# Patient Record
Sex: MALE | Race: BLACK OR AFRICAN AMERICAN | Employment: FULL TIME | URBAN - METROPOLITAN AREA
[De-identification: names, ages, dates, MRNs, and addresses within clinical notes are randomized per-mention and may not be internally consistent; named-entity substitution may affect disease eponyms.]

---

## 2017-08-10 ENCOUNTER — HOSPITAL ENCOUNTER (EMERGENCY)
Age: 72
Discharge: HOME | End: 2017-08-10
Attending: EMERGENCY MEDICINE | Admitting: EMERGENCY MEDICINE
Payer: MEDICARE

## 2017-08-10 ENCOUNTER — APPOINTMENT (OUTPATIENT)
Dept: CT IMAGING | Age: 72
End: 2017-08-10
Attending: EMERGENCY MEDICINE
Payer: MEDICARE

## 2017-08-10 VITALS
HEART RATE: 64 BPM | HEIGHT: 71 IN | BODY MASS INDEX: 25.2 KG/M2 | TEMPERATURE: 98.2 F | SYSTOLIC BLOOD PRESSURE: 169 MMHG | OXYGEN SATURATION: 100 % | RESPIRATION RATE: 19 BRPM | WEIGHT: 180 LBS | DIASTOLIC BLOOD PRESSURE: 74 MMHG

## 2017-08-10 DIAGNOSIS — D64.9 CHRONIC ANEMIA: ICD-10-CM

## 2017-08-10 DIAGNOSIS — R25.3 TWITCHING: ICD-10-CM

## 2017-08-10 DIAGNOSIS — R25.1 EPISODE OF SHAKING: Primary | ICD-10-CM

## 2017-08-10 LAB
ALBUMIN SERPL BCP-MCNC: 3.5 G/DL (ref 3.4–5)
ALBUMIN/GLOB SERPL: 1 {RATIO} (ref 0.8–1.7)
ALP SERPL-CCNC: 97 U/L (ref 45–117)
ALT SERPL-CCNC: 19 U/L (ref 16–61)
ANION GAP BLD CALC-SCNC: 9 MMOL/L (ref 3–18)
AST SERPL W P-5'-P-CCNC: 13 U/L (ref 15–37)
BASOPHILS # BLD AUTO: 0 K/UL (ref 0–0.06)
BASOPHILS # BLD: 0 % (ref 0–2)
BILIRUB SERPL-MCNC: 0.2 MG/DL (ref 0.2–1)
BUN SERPL-MCNC: 22 MG/DL (ref 7–18)
BUN/CREAT SERPL: 12 (ref 12–20)
CALCIUM SERPL-MCNC: 8.5 MG/DL (ref 8.5–10.1)
CHLORIDE SERPL-SCNC: 109 MMOL/L (ref 100–108)
CK MB CFR SERPL CALC: 1.3 % (ref 0–4)
CK MB SERPL-MCNC: 2.7 NG/ML (ref 5–25)
CK SERPL-CCNC: 216 U/L (ref 39–308)
CO2 SERPL-SCNC: 23 MMOL/L (ref 21–32)
CREAT SERPL-MCNC: 1.78 MG/DL (ref 0.6–1.3)
DIFFERENTIAL METHOD BLD: ABNORMAL
EOSINOPHIL # BLD: 0.5 K/UL (ref 0–0.4)
EOSINOPHIL NFR BLD: 9 % (ref 0–5)
ERYTHROCYTE [DISTWIDTH] IN BLOOD BY AUTOMATED COUNT: 13.4 % (ref 11.6–14.5)
GLOBULIN SER CALC-MCNC: 3.4 G/DL (ref 2–4)
GLUCOSE SERPL-MCNC: 184 MG/DL (ref 74–99)
HCT VFR BLD AUTO: 33.8 % (ref 36–48)
HGB BLD-MCNC: 10.9 G/DL (ref 13–16)
LYMPHOCYTES # BLD AUTO: 45 % (ref 21–52)
LYMPHOCYTES # BLD: 2.7 K/UL (ref 0.9–3.6)
MCH RBC QN AUTO: 29.5 PG (ref 24–34)
MCHC RBC AUTO-ENTMCNC: 32.2 G/DL (ref 31–37)
MCV RBC AUTO: 91.6 FL (ref 74–97)
MONOCYTES # BLD: 0.4 K/UL (ref 0.05–1.2)
MONOCYTES NFR BLD AUTO: 7 % (ref 3–10)
NEUTS SEG # BLD: 2.4 K/UL (ref 1.8–8)
NEUTS SEG NFR BLD AUTO: 39 % (ref 40–73)
PLATELET # BLD AUTO: 217 K/UL (ref 135–420)
PMV BLD AUTO: 11.1 FL (ref 9.2–11.8)
POTASSIUM SERPL-SCNC: 4 MMOL/L (ref 3.5–5.5)
PROT SERPL-MCNC: 6.9 G/DL (ref 6.4–8.2)
RBC # BLD AUTO: 3.69 M/UL (ref 4.7–5.5)
SODIUM SERPL-SCNC: 141 MMOL/L (ref 136–145)
TROPONIN I SERPL-MCNC: <0.02 NG/ML (ref 0–0.04)
WBC # BLD AUTO: 6 K/UL (ref 4.6–13.2)

## 2017-08-10 PROCEDURE — 99284 EMERGENCY DEPT VISIT MOD MDM: CPT

## 2017-08-10 PROCEDURE — 85025 COMPLETE CBC W/AUTO DIFF WBC: CPT | Performed by: EMERGENCY MEDICINE

## 2017-08-10 PROCEDURE — 74011250636 HC RX REV CODE- 250/636: Performed by: EMERGENCY MEDICINE

## 2017-08-10 PROCEDURE — 82550 ASSAY OF CK (CPK): CPT | Performed by: EMERGENCY MEDICINE

## 2017-08-10 PROCEDURE — 80053 COMPREHEN METABOLIC PANEL: CPT | Performed by: EMERGENCY MEDICINE

## 2017-08-10 PROCEDURE — 70450 CT HEAD/BRAIN W/O DYE: CPT

## 2017-08-10 PROCEDURE — 74011250637 HC RX REV CODE- 250/637: Performed by: EMERGENCY MEDICINE

## 2017-08-10 PROCEDURE — 96360 HYDRATION IV INFUSION INIT: CPT

## 2017-08-10 RX ORDER — LORAZEPAM 0.5 MG/1
0.5 TABLET ORAL
Status: COMPLETED | OUTPATIENT
Start: 2017-08-10 | End: 2017-08-10

## 2017-08-10 RX ORDER — DIAZEPAM 2 MG/1
2 TABLET ORAL
Qty: 6 TAB | Refills: 0 | Status: SHIPPED | OUTPATIENT
Start: 2017-08-10 | End: 2017-11-15

## 2017-08-10 RX ORDER — FLUTICASONE PROPIONATE AND SALMETEROL 250; 50 UG/1; UG/1
1 POWDER RESPIRATORY (INHALATION) EVERY 12 HOURS
COMMUNITY
End: 2018-06-15

## 2017-08-10 RX ADMIN — LORAZEPAM 0.5 MG: 0.5 TABLET ORAL at 03:16

## 2017-08-10 RX ADMIN — SODIUM CHLORIDE 1000 ML: 900 INJECTION, SOLUTION INTRAVENOUS at 02:03

## 2017-08-10 NOTE — DISCHARGE INSTRUCTIONS
The reasoning behind your twitching/jerking episodes is unclear at this time. You will need to follow up with your regular doctor for further evaluation, possible neurology referral    Avoid excessive caffeine use or other stimulants.    Get plenty of sleep  Avoid alcohol, smoking, and illegal drugs

## 2017-08-10 NOTE — ED PROVIDER NOTES
HPI Comments: Cristy Bethea is a 71 y.o. Male with h/o niddm with c/o intermittent, freq twitching all over that started earlier yesterday. Waking him up tonight. Had headache earlier that resolved. No numbness, tingling, weakness, gait diff, or diff driving here. No visual dist, swallowing diff, cp, sob, abd pain, nvd, fcs, urinary sx. No new meds. Smokes occ. No sig alcohol or drug use    The history is provided by the patient. Past Medical History:   Diagnosis Date    Diabetes mellitus ([address]     Hypercholesteremia     Hypertension        History reviewed. No pertinent surgical history. History reviewed. No pertinent family history. Social History     Social History    Marital status: SINGLE     Spouse name: N/A    Number of children: N/A    Years of education: N/A     Occupational History    Not on file. Social History Main Topics    Smoking status: Current Every Day Smoker     Packs/day: 0.50     Years: 40.00     Types: Cigarettes    Smokeless tobacco: Former User     Quit date: 7/7/2009      Comment: Prior to quiting 1 pack per day    Alcohol use Yes      Comment: occasionally    Drug use: No    Sexual activity: Not on file     Other Topics Concern    Not on file     Social History Narrative         ALLERGIES: Review of patient's allergies indicates no known allergies. Review of Systems   Constitutional: Negative for fever. HENT: Negative for sore throat, tinnitus and trouble swallowing. Eyes: Negative for visual disturbance. Respiratory: Negative for cough and shortness of breath. Cardiovascular: Negative for chest pain. Gastrointestinal: Negative for abdominal pain. Genitourinary: Negative for difficulty urinating. Musculoskeletal: Negative for gait problem. Skin: Negative for rash. Allergic/Immunologic: Negative for immunocompromised state. Neurological: Negative for dizziness, facial asymmetry and light-headedness.    Psychiatric/Behavioral: Positive for sleep disturbance. Vitals:    08/10/17 0147   BP: 198/85   Pulse: 69   Resp: 14   Temp: 98.2 °F (36.8 °C)   SpO2: 100%   Weight: 81.6 kg (180 lb)   Height: 5' 11\" (1.803 m)            Physical Exam   Constitutional: He is oriented to person, place, and time. Non-toxic appearance. He does not appear ill. No distress. HENT:   Head: Normocephalic and atraumatic. Right Ear: External ear normal.   Left Ear: External ear normal.   Nose: Nose normal.   Mouth/Throat: Oropharynx is clear and moist. No oropharyngeal exudate. Eyes: Conjunctivae and EOM are normal. Pupils are equal, round, and reactive to light. Neck: Normal range of motion. Neck supple. Cardiovascular: Normal rate, regular rhythm, normal heart sounds and intact distal pulses. Pulmonary/Chest: Effort normal and breath sounds normal. No respiratory distress. Abdominal: Soft. There is no tenderness. Musculoskeletal: Normal range of motion. He exhibits no edema. Neurological: He is alert and oriented to person, place, and time. He displays no tremor and normal reflexes. No cranial nerve deficit (3-12) or sensory deficit (gross touch intact all ext). He exhibits normal muscle tone. He displays no seizure activity. Coordination (nl FN) and gait normal. GCS eye subscore is 4. GCS verbal subscore is 5. GCS motor subscore is 6. No drift   Skin: Skin is warm and dry. He is not diaphoretic. Psychiatric: His behavior is normal.   Nursing note and vitals reviewed.        [facility]  ED Course       Procedures    Vitals:  Patient Vitals for the past 12 hrs:   Temp Pulse Resp BP SpO2   08/10/17 0147 98.2 °F (36.8 °C) 69 14 198/85 100 %         Medications ordered:   Medications   sodium chloride 0.9 % bolus infusion 1,000 mL (0 mL IntraVENous IV Completed 8/10/17 0240)         Lab findings:  Recent Results (from the past 12 hour(s))   CBC WITH AUTOMATED DIFF    Collection Time: 08/10/17  2:00 AM   Result Value Ref Range    WBC 6.0 4.6 - 13.2 K/uL    RBC 3.69 (L) 4.70 - 5.50 M/uL    HGB 10.9 (L) 13.0 - 16.0 g/dL    HCT 33.8 (L) 36.0 - 48.0 %    MCV 91.6 74.0 - 97.0 FL    MCH 29.5 24.0 - 34.0 PG    MCHC 32.2 31.0 - 37.0 g/dL    RDW 13.4 11.6 - 14.5 %    PLATELET 074 513 - 163 K/uL    MPV 11.1 9.2 - 11.8 FL    NEUTROPHILS 39 (L) 40 - 73 %    LYMPHOCYTES 45 21 - 52 %    MONOCYTES 7 3 - 10 %    EOSINOPHILS 9 (H) 0 - 5 %    BASOPHILS 0 0 - 2 %    ABS. NEUTROPHILS 2.4 1.8 - 8.0 K/UL    ABS. LYMPHOCYTES 2.7 0.9 - 3.6 K/UL    ABS. MONOCYTES 0.4 0.05 - 1.2 K/UL    ABS. EOSINOPHILS 0.5 (H) 0.0 - 0.4 K/UL    ABS. BASOPHILS 0.0 0.0 - 0.06 K/UL    DF AUTOMATED     METABOLIC PANEL, COMPREHENSIVE    Collection Time: 08/10/17  2:00 AM   Result Value Ref Range    Sodium 141 136 - 145 mmol/L    Potassium 4.0 3.5 - 5.5 mmol/L    Chloride 109 (H) 100 - 108 mmol/L    CO2 23 21 - 32 mmol/L    Anion gap 9 3.0 - 18 mmol/L    Glucose 184 (H) 74 - 99 mg/dL    BUN 22 (H) 7.0 - 18 MG/DL    Creatinine 1.78 (H) 0.6 - 1.3 MG/DL    BUN/Creatinine ratio 12 12 - 20      GFR est AA 46 (L) >60 ml/min/1.73m2    GFR est non-AA 38 (L) >60 ml/min/1.73m2    Calcium 8.5 8.5 - 10.1 MG/DL    Bilirubin, total 0.2 0.2 - 1.0 MG/DL    ALT (SGPT) 19 16 - 61 U/L    AST (SGOT) 13 (L) 15 - 37 U/L    Alk. phosphatase 97 45 - 117 U/L    Protein, total 6.9 6.4 - 8.2 g/dL    Albumin 3.5 3.4 - 5.0 g/dL    Globulin 3.4 2.0 - 4.0 g/dL    A-G Ratio 1.0 0.8 - 1.7     CARDIAC PANEL,(CK, CKMB & TROPONIN)    Collection Time: 08/10/17  2:00 AM   Result Value Ref Range     39 - 308 U/L    CK - MB 2.7 <3.6 ng/ml    CK-MB Index 1.3 0.0 - 4.0 %    Troponin-I, Qt. <0.02 0.0 - 0.045 NG/ML       EKG interpretation by ED Physician:      X-Ray, CT or other radiology findings or impressions:  CT HEAD WO CONT    (Results Pending)     -limited by motion, but nothing acute-mild chronic microvascular dx  Progress notes, Consult notes or additional Procedure notes:   No sig episodes. Not c/w seizures.  Doubt infection need for other work up here  I have discussed with patient and/or family/sig other the results, interpretation of any imaging if performed, suspected diagnosis and treatment plan to include instructions regarding the diagnoses listed to which understanding was expressed with all questions answered      Reevaluation of patient:   Stable for dc    Disposition:  Diagnosis:   1. Episode of shaking    2. Twitching    3. Chronic anemia        Disposition: home      Follow-up Information     Follow up With Details Comments Contact Info    Divya Duarte MD Schedule an appointment as soon as possible for a visit  [address] [facility] EMERGENCY DEPT  If symptoms worsen [address]  [phone]            Patient's Medications   Start Taking    DIAZEPAM (VALIUM) 2 MG TABLET    Take 1 Tab by mouth every twelve (12) hours as needed (shaking). Max Daily Amount: 4 mg. Continue Taking    ALBUTEROL (PROVENTIL HFA) 90 MCG/ACTUATION INHALER    Take 1 Puff by inhalation every four (4) hours as needed for Wheezing. ASPIRIN DELAYED-RELEASE 81 MG TABLET    Take  by mouth daily. DILTIAZEM XR (DILACOR XR) 240 MG XR CAPSULE    Take  by mouth daily. FLUTICASONE-SALMETEROL (ADVAIR DISKUS) 250-50 MCG/DOSE DISKUS INHALER    Take 1 Puff by inhalation every twelve (12) hours. GLIPIZIDE (GLUCOTROL) 10 MG TABLET    Take 10 mg by mouth two (2) times a day. LISINOPRIL (PRINIVIL) 20 MG TABLET    Take  by mouth daily. METFORMIN (GLUCOPHAGE) 850 MG TABLET    Take  by mouth two (2) times daily (with meals). These Medications have changed    No medications on file   Stop Taking    ACETAMINOPHEN-CODEINE (TYLENOL-CODEINE #3) 300-30 MG PER TABLET    Take 1 Tab by mouth every four (4) hours as needed for Pain. Max Daily Amount: 6 Tabs.     AZITHROMYCIN (ZITHROMAX Z-JEAN) 250 MG TABLET    Take two tablets today then one tablet daily    OTHER    Has an inhaler--unsure of the name

## 2017-08-10 NOTE — ED NOTES
,I have reviewed discharge instructions with the patient. The patient verbalized understanding.     Patient armband removed and shredded

## 2017-08-10 NOTE — LETTER
NOTIFICATION RETURN TO WORK / SCHOOL 
 
8/10/2017 3:02 AM 
 
Mr. Floridalma Alegria [phone] To Whom It May Concern: 
 
Floridalma Alegria is currently under the care of [address] [place] [address] EMERGENCY DEPT. He will return to work/school on: 8/11/17 If there are questions or concerns please have the patient contact our office. Sincerely, aMrifer Aguayo MD

## 2017-08-10 NOTE — ED NOTES
Observed that patient is having what he calls \"shakes\"- intermittent muscle spasms in patient's upper body/torso/arms/hands that conclude with the patient to briefly pausing in place with wide open eyes in a blank stare.

## 2017-11-15 ENCOUNTER — HOSPITAL ENCOUNTER (EMERGENCY)
Age: 72
Discharge: HOME | End: 2017-11-16
Attending: EMERGENCY MEDICINE
Payer: MEDICARE

## 2017-11-15 DIAGNOSIS — R06.2 WHEEZING: ICD-10-CM

## 2017-11-15 DIAGNOSIS — R25.1 EPISODE OF SHAKING: Primary | ICD-10-CM

## 2017-11-15 PROCEDURE — 99284 EMERGENCY DEPT VISIT MOD MDM: CPT

## 2017-11-16 VITALS
HEIGHT: 71 IN | OXYGEN SATURATION: 96 % | WEIGHT: 181 LBS | HEART RATE: 73 BPM | RESPIRATION RATE: 16 BRPM | DIASTOLIC BLOOD PRESSURE: 84 MMHG | SYSTOLIC BLOOD PRESSURE: 180 MMHG | TEMPERATURE: 97.6 F | BODY MASS INDEX: 25.34 KG/M2

## 2017-11-16 LAB — GLUCOSE BLD STRIP.AUTO-MCNC: 82 MG/DL (ref 70–110)

## 2017-11-16 PROCEDURE — 77030029684 HC NEB SM VOL KT MONA -A

## 2017-11-16 PROCEDURE — 74011000250 HC RX REV CODE- 250: Performed by: PHYSICIAN ASSISTANT

## 2017-11-16 PROCEDURE — 82962 GLUCOSE BLOOD TEST: CPT

## 2017-11-16 PROCEDURE — 94640 AIRWAY INHALATION TREATMENT: CPT

## 2017-11-16 RX ORDER — GABAPENTIN 100 MG/1
100 CAPSULE ORAL 3 TIMES DAILY
Qty: 30 CAP | Refills: 0 | Status: SHIPPED | OUTPATIENT
Start: 2017-11-16 | End: 2018-05-09

## 2017-11-16 RX ORDER — IPRATROPIUM BROMIDE AND ALBUTEROL SULFATE 2.5; .5 MG/3ML; MG/3ML
3 SOLUTION RESPIRATORY (INHALATION)
Status: COMPLETED | OUTPATIENT
Start: 2017-11-16 | End: 2017-11-16

## 2017-11-16 RX ADMIN — IPRATROPIUM BROMIDE AND ALBUTEROL SULFATE 3 ML: .5; 3 SOLUTION RESPIRATORY (INHALATION) at 00:21

## 2017-11-16 NOTE — ED PROVIDER NOTES
HPI Comments: Paula Lugo is a 71 y.o. male with a pertinent history of DM, HTN, HLD who presents to the emergency department for evaluation of intermittent body twitching x 1 day. States this happened to him in August and it was exactly the same. No recent changes in medications. No drug or ETOH abuse. Denies caffeine. States twitching is \"all over. \"  No generalized or focal numbness/weakness/tingling. States he followed up with PCP after last visit, but was never referred to neurology. Pt denies any fevers or chills, headache, head injury, visual changes, dizziness or light headedness, ENT issues, CP or discomfort, SOB, cough, n/v/d/c, abd pain, back pain, diaphoresis, dysuria, hematuria, frequency. Patient has no other complaints at this time. PCP:  Dontae Brown MD        Patient is a 71 y.o. male presenting with other event. Other   Pertinent negatives include no chest pain, no abdominal pain, no headaches and no shortness of breath. Past Medical History:   Diagnosis Date    Diabetes mellitus ([facility] [address]     Hypercholesteremia     Hypertension        No past surgical history on file. No family history on file. Social History     Social History    Marital status: SINGLE     Spouse name: N/A    Number of children: N/A    Years of education: N/A     Occupational History    Not on file. Social History Main Topics    Smoking status: Current Every Day Smoker     Packs/day: 0.50     Years: 40.00     Types: Cigarettes    Smokeless tobacco: Former User     Quit date: 7/7/2009      Comment: Prior to quiting 1 pack per day    Alcohol use Yes      Comment: occasionally    Drug use: No    Sexual activity: Not on file     Other Topics Concern    Not on file     Social History Narrative         ALLERGIES: Review of patient's allergies indicates no known allergies. Review of Systems   Constitutional: Negative for chills and fever.    HENT: Negative for congestion, rhinorrhea and sore throat. Respiratory: Negative for cough and shortness of breath. Cardiovascular: Negative for chest pain. Gastrointestinal: Negative for abdominal pain, constipation, diarrhea, nausea and vomiting. Musculoskeletal: Negative for back pain and myalgias. Skin: Negative for wound. Neurological: Positive for tremors. Negative for dizziness, syncope, facial asymmetry, weakness, numbness and headaches. Vitals:    11/15/17 2252   BP: 183/86   Pulse: 70   Resp: 16   Temp: 97.6 °F (36.4 °C)   SpO2: 95%   Weight: 82.1 kg (181 lb)   Height: 5' 11\" (1.803 m)            Physical Exam   Constitutional: He is oriented to person, place, and time. He appears well-developed and well-nourished. No distress. HENT:   Head: Normocephalic and atraumatic. Mouth/Throat: Oropharynx is clear and moist.   Eyes: Conjunctivae and EOM are normal. Right eye exhibits no discharge. Left eye exhibits no discharge. Neck: Normal range of motion. Neck supple. Cardiovascular: Normal rate, regular rhythm and normal heart sounds. Exam reveals no gallop and no friction rub. No murmur heard. Pulmonary/Chest: Effort normal. No respiratory distress. He has wheezes. He exhibits no tenderness. Bilateral inspiratory and expiratory wheezing   Musculoskeletal: Normal range of motion. He exhibits no edema or deformity. Neurological: He is alert and oriented to person, place, and time. No tremor/shaking/convusions noted   Skin: Skin is warm and dry. He is not diaphoretic. Psychiatric: He has a normal mood and affect. Nursing note and vitals reviewed. MDM  Number of Diagnoses or Management Options  Episode of shaking: minor  Wheezing: new and does not require workup  Diagnosis management comments: Differential Diagnosis: BET, parkinson's, electrolyte abnormality, medication effect, anxiety, substance abuse    Plan:  Pt presents in NAD, vitals wnl. Wheezing on exam.  Otherwise unremarkable.   No tremors noted here.  Pt denies them at this time. POC glucose wnl. Will DC home with outpatient neuro follow-up. Will DC home with 100mg neurontin TID per Dr. Airam Jackson. At this time, patient is stable and appropriate for discharge home. Patient demonstrates understanding of current diagnoses and is in agreement with the treatment plan. They are advised that while the likelihood of serious underlying condition is low at this point given the evaluation performed today, we cannot fully rule it out. They are advised to immediately return with any new symptoms or worsening of current condition. All questions have been answered. Patient is given educational material regarding their diagnoses, including danger symptoms and when to return to the ED. Amount and/or Complexity of Data Reviewed  Clinical lab tests: ordered and reviewed  Review and summarize past medical records: yes  Discuss the patient with other providers: yes (Dr. Airam Jackson  )    Risk of Complications, Morbidity, and/or Mortality  Presenting problems: low  Diagnostic procedures: minimal  Management options: low    Patient Progress  Patient progress: stable    ED Course       Procedures    -------------------------------------------------------------------------------------------------------------------  Orders:  Orders Placed This Encounter    POC GLUCOSE     Standing Status:   Standing     Number of Occurrences:   1    GLUCOSE, POC     Standing Status:   Standing     Number of Occurrences:   1    SALINE LOCK IV ONE TIME STAT     Standing Status:   Standing     Number of Occurrences:   1    albuterol-ipratropium (DUO-NEB) 2.5 MG-0.5 MG/3 ML     Order Specific Question:   MODE OF DELIVERY     Answer:   Nebulizer    gabapentin (NEURONTIN) 100 mg capsule     Sig: Take 1 Cap by mouth three (3) times daily.      Dispense:  30 Cap     Refill:  0        Lab Results:   Recent Results (from the past 12 hour(s))   GLUCOSE, POC    Collection Time: 11/16/17 12:20 AM Result Value Ref Range    Glucose (POC) 82 70 - 110 mg/dL     Progress Notes:  12:05 AM:  Taqueria Linares PA-C was at the pt's bedside, assessed the pt and answered the pt's questions regarding treatment. 12:08 AM:  Discussed with Dr. Tila Brock. Check POC BG. No indication for repeat labs or imaging. Taqueria Linares PA-C    -------------------------------------------------------------------------------------------------------------------    Disposition:  Diagnosis:   1. Episode of shaking    2. Wheezing        [facility]    Follow-up Information     Follow up With Details Comments Contact Info    Javier Wick MD Call in 1 day For follow-up regarding abnormal shaking  [address]  [facility]  [phone]      [facility] EMERGENCY DEPT Go to As needed, If symptoms worsen [address] [facility] [address]. [phone]          Patient's Medications   Start Taking    GABAPENTIN (NEURONTIN) 100 MG CAPSULE    Take 1 Cap by mouth three (3) times daily. Continue Taking    ALBUTEROL (PROVENTIL HFA) 90 MCG/ACTUATION INHALER    Take 1 Puff by inhalation every four (4) hours as needed for Wheezing. ASPIRIN DELAYED-RELEASE 81 MG TABLET    Take  by mouth daily. DILTIAZEM XR (DILACOR XR) 240 MG XR CAPSULE    Take  by mouth daily. FLUTICASONE-SALMETEROL (ADVAIR DISKUS) 250-50 MCG/DOSE DISKUS INHALER    Take 1 Puff by inhalation every twelve (12) hours. GLIPIZIDE (GLUCOTROL) 10 MG TABLET    Take 10 mg by mouth two (2) times a day. LISINOPRIL (PRINIVIL) 20 MG TABLET    Take  by mouth daily. METFORMIN (GLUCOPHAGE) 850 MG TABLET    Take  by mouth two (2) times daily (with meals). These Medications have changed    No medications on file   Stop Taking    DIAZEPAM (VALIUM) 2 MG TABLET    Take 1 Tab by mouth every twelve (12) hours as needed (shaking). Max Daily Amount: 4 mg.

## 2017-11-16 NOTE — ED NOTES
Patient with c/o \"shaking\" x 2 days. States he has tremors in his arms that he can not stop. Reports productive cough producing yellow mucous. Denies chest pain, denies SOB.

## 2017-11-16 NOTE — ED TRIAGE NOTES
Patient states \"shaking\" off and on since yesterday. States history of this, was seen here and told it was \"caffeine and stress\". Patient denies any caffeine. Denies any weakness or pain. No LOC or recent fall.

## 2017-11-16 NOTE — DISCHARGE INSTRUCTIONS
Wheezing or Bronchoconstriction: Care Instructions  Your Care Instructions  Wheezing is a whistling noise made during breathing. It occurs when the small airways, or bronchial tubes, that lead to your lungs swell or contract (spasm) and become narrow. This narrowing is called bronchoconstriction. When your airways constrict, it is hard for air to pass through and this makes it hard for you to breathe. Wheezing and bronchoconstriction can be caused by many problems, including:  · An infection such as the flu or a cold. · Allergies such as hay fever. · Diseases such as asthma or chronic obstructive pulmonary disease. · Smoking. Treatment for your wheezing depends on what is causing the problem. Your wheezing may get better without treatment. But you may need to pay attention to things that cause your wheezing and avoid them. Or you may need medicine to help treat the wheezing and to reduce the swelling or to relieve spasms in your lungs. Follow-up care is a key part of your treatment and safety. Be sure to make and go to all appointments, and call your doctor if you are having problems. It is also a good idea to know your test results and keep a list of the medicines you take. How can you care for yourself at home? · Take your medicine exactly as prescribed. Call your doctor if you think you are having a problem with your medicine. You will get more details on the specific medicine your doctor prescribes. · If your doctor prescribed antibiotics, take them as directed. Do not stop taking them just because you feel better. You need to take the full course of antibiotics. · Breathe moist air from a humidifier, hot shower, or sink filled with hot water. This may help ease your symptoms and make it easier for you to breathe. · If you have congestion in your nose and throat, drinking plenty of fluids, especially hot fluids, may help relieve your symptoms.  If you have kidney, heart, or liver disease and have to limit fluids, talk with your doctor before you increase the amount of fluids you drink. · If you have mucus in your airways, it may help to breathe deeply and cough. · Do not smoke or allow others to smoke around you. Smoking can make your wheezing worse. If you need help quitting, talk to your doctor about stop-smoking programs and medicines. These can increase your chances of quitting for good. · Avoid things that may cause your wheezing. These may include colds, smoke, air pollution, dust, pollen, pets, cockroaches, stress, and cold air. When should you call for help? Call 911 anytime you think you may need emergency care. For example, call if:  ? · You have severe trouble breathing. ? · You passed out (lost consciousness). ?Call your doctor now or seek immediate medical care if:  ? · You cough up yellow, dark brown, or bloody mucus (sputum). ? · You have new or worse shortness of breath. ? · Your wheezing is not getting better or it gets worse after you start taking your medicine. ? Watch closely for changes in your health, and be sure to contact your doctor if:  ? · You do not get better as expected. Where can you learn more? Go to http://lula-antonia.info/. Enter 454 [id number] in the search box to learn more about \"Wheezing or Bronchoconstriction: Care Instructions. \"  Current as of: May 12, 2017  Content Version: 11.4  © 6109-7311 [employer]. Care instructions adapted under license by [employer] (which disclaims liability or warranty for this information). If you have questions about a medical condition or this instruction, always ask your healthcare professional. [address] any warranty or liability for your use of this information.

## 2018-07-19 ENCOUNTER — HOSPITAL ENCOUNTER (OUTPATIENT)
Dept: CT IMAGING | Age: 73
Discharge: HOME | End: 2018-07-19
Attending: PHYSICIAN ASSISTANT
Payer: MEDICARE

## 2018-07-19 ENCOUNTER — HOSPITAL ENCOUNTER (OUTPATIENT)
Dept: NUCLEAR MEDICINE | Age: 73
Discharge: HOME | End: 2018-07-19
Attending: PHYSICIAN ASSISTANT
Payer: MEDICARE

## 2018-07-19 DIAGNOSIS — C61 PROSTATE CANCER (HCC): ICD-10-CM

## 2018-07-19 PROCEDURE — 78306 BONE IMAGING WHOLE BODY: CPT

## 2018-07-19 PROCEDURE — 74176 CT ABD & PELVIS W/O CONTRAST: CPT

## 2018-07-19 NOTE — PROGRESS NOTES
Patient was scheduled for nuclear medicine bone scan and CT scan today 7/19/18. Patient completed CT scan and was then sent home by CT department. Contacted patient via mobile phone and asked if he was able to come back today to complete bone scan. Patient requested to reschedule bone scan for Monday 7/23/18 at 915. Radiology  received call from patient's daughter @ [phone]. Patient will be returning today to complete bone scan.

## 2018-07-25 PROBLEM — C61 MALIGNANT NEOPLASM OF PROSTATE (HCC): Status: ACTIVE | Noted: 2018-07-25

## 2018-08-29 ENCOUNTER — HOSPITAL ENCOUNTER (OUTPATIENT)
Dept: PREADMISSION TESTING | Age: 73
Discharge: HOME | End: 2018-08-29
Payer: MEDICARE

## 2018-08-29 ENCOUNTER — HOSPITAL ENCOUNTER (OUTPATIENT)
Dept: LAB | Age: 73
Discharge: HOME | End: 2018-08-29
Payer: MEDICARE

## 2018-08-29 DIAGNOSIS — Z01.818 PRE-OP EVALUATION: ICD-10-CM

## 2018-08-29 DIAGNOSIS — C61 MALIGNANT NEOPLASM OF PROSTATE (HCC): ICD-10-CM

## 2018-08-29 LAB
ABO + RH BLD: NORMAL
ANION GAP SERPL CALC-SCNC: 5 MMOL/L (ref 3–18)
BASOPHILS # BLD: 0 K/UL (ref 0–0.1)
BASOPHILS NFR BLD: 0 % (ref 0–2)
BLOOD GROUP ANTIBODIES SERPL: NORMAL
BUN SERPL-MCNC: 31 MG/DL (ref 7–18)
BUN/CREAT SERPL: 19 (ref 12–20)
CALCIUM SERPL-MCNC: 9 MG/DL (ref 8.5–10.1)
CHLORIDE SERPL-SCNC: 110 MMOL/L (ref 100–108)
CO2 SERPL-SCNC: 27 MMOL/L (ref 21–32)
CREAT SERPL-MCNC: 1.62 MG/DL (ref 0.6–1.3)
DIFFERENTIAL METHOD BLD: ABNORMAL
EOSINOPHIL # BLD: 0.5 K/UL (ref 0–0.4)
EOSINOPHIL NFR BLD: 7 % (ref 0–5)
ERYTHROCYTE [DISTWIDTH] IN BLOOD BY AUTOMATED COUNT: 15 % (ref 11.6–14.5)
GLUCOSE SERPL-MCNC: 120 MG/DL (ref 74–99)
HCT VFR BLD AUTO: 34.5 % (ref 36–48)
HGB BLD-MCNC: 11.1 G/DL (ref 13–16)
LYMPHOCYTES # BLD: 2.3 K/UL (ref 0.9–3.6)
LYMPHOCYTES NFR BLD: 33 % (ref 21–52)
MCH RBC QN AUTO: 29.1 PG (ref 24–34)
MCHC RBC AUTO-ENTMCNC: 32.2 G/DL (ref 31–37)
MCV RBC AUTO: 90.3 FL (ref 74–97)
MONOCYTES # BLD: 0.6 K/UL (ref 0.05–1.2)
MONOCYTES NFR BLD: 8 % (ref 3–10)
NEUTS SEG # BLD: 3.5 K/UL (ref 1.8–8)
NEUTS SEG NFR BLD: 52 % (ref 40–73)
PLATELET # BLD AUTO: 229 K/UL (ref 135–420)
PMV BLD AUTO: 11.6 FL (ref 9.2–11.8)
POTASSIUM SERPL-SCNC: 4.6 MMOL/L (ref 3.5–5.5)
RBC # BLD AUTO: 3.82 M/UL (ref 4.7–5.5)
SODIUM SERPL-SCNC: 142 MMOL/L (ref 136–145)
SPECIMEN EXP DATE BLD: NORMAL
WBC # BLD AUTO: 6.8 K/UL (ref 4.6–13.2)

## 2018-08-29 PROCEDURE — 85025 COMPLETE CBC W/AUTO DIFF WBC: CPT | Performed by: UROLOGY

## 2018-08-29 PROCEDURE — 93005 ELECTROCARDIOGRAM TRACING: CPT

## 2018-08-29 PROCEDURE — 80048 BASIC METABOLIC PNL TOTAL CA: CPT | Performed by: UROLOGY

## 2018-08-29 PROCEDURE — 86900 BLOOD TYPING SEROLOGIC ABO: CPT | Performed by: UROLOGY

## 2018-08-29 PROCEDURE — 36415 COLL VENOUS BLD VENIPUNCTURE: CPT | Performed by: UROLOGY

## 2018-08-29 NOTE — OP NOTES
PAT - SURGICAL PRE-ADMISSION INSTRUCTIONS    NAME:  Nicholas Drake                                                          TODAY'S DATE:  8/29/2018    SURGERY DATE:  8/31/2018                                  SURGERY ARRIVAL TIME:   1000    1. Do NOT eat or drink anything, including candy or gum, after MIDNIGHT on 08/30/2018 , unless you have specific instructions from your Surgeon or Anesthesia Provider to do so. 2. No smoking on the day of surgery. 3. No alcohol 24 hours prior to the day of surgery. 4. No recreational drugs for one week prior to the day of surgery. 5. Leave all valuables, including money/purse, at home. 6. Remove all jewelry, nail polish, makeup (including mascara); no lotions, powders, deodorant, or perfume/cologne/after shave. 7. Glasses/Contact lenses and Dentures may be worn to the hospital.  They will be removed prior to surgery. 8. Call your doctor if symptoms of a cold or illness develop within 24 ours prior to surgery. 9. AN ADULT MUST DRIVE YOU HOME AFTER OUTPATIENT SURGERY. 10. If you are having an OUTPATIENT procedure, please make arrangements for a responsible adult to be with you for 24 hours after your surgery. 11. If you are admitted to the hospital, you will be assigned to a bed after surgery is complete. Normally a family member will not be able to see you until you are in your assigned bed. 15. Family is encouraged to accompany you to the hospital.  We ask visitors in the treatment area to be limited to ONE person at a time to ensure patient privacy. EXCEPTIONS WILL BE MADE AS NEEDED. 15. Children under 12 are discouraged from entering the treatment area and need to be supervised by an adult when in the waiting room. Special Instructions:    Bring inhaler. , Take these medications the morning of surgery with a sip of water:  Diltiazem, Synthroid and Inhalers, Bring any pertinent legal medical records., HOLD oral diabetic medication on the MORNING OF surgery. , STOP anticoagulants AT LEAST 1 WEEK PRIOR to your surgery or, follow other MD instructions:  Stopped aspirin 08/29/2018    Patient Prep:    use CHG solution    These surgical instructions were reviewed with Sunny Gao and family during the PAT visit/. A printed copy of the instructions was provided to Sunny Gao    Directions: On the morning of surgery, please go to the [address] [place] [address]. Enter the building from the [facility] entrance, 1st floor (next to the Emergency Room entrance). Take the elevator to the 2nd floor. Sign in at the Registration Desk.     If you have any questions and/or concerns, please do not hesitate to call:  (Prior to the day of surgery)  [facility] unit:  [phone]  (Day of surgery)  [facility] unit:  [phone]

## 2018-08-30 ENCOUNTER — ANESTHESIA EVENT (OUTPATIENT)
Dept: SURGERY | Age: 73
End: 2018-08-30
Payer: MEDICARE

## 2018-08-30 LAB
ATRIAL RATE: 51 BPM
CALCULATED P AXIS, ECG09: 59 DEGREES
CALCULATED R AXIS, ECG10: 63 DEGREES
CALCULATED T AXIS, ECG11: 68 DEGREES
DIAGNOSIS, 93000: NORMAL
P-R INTERVAL, ECG05: 182 MS
Q-T INTERVAL, ECG07: 408 MS
QRS DURATION, ECG06: 104 MS
QTC CALCULATION (BEZET), ECG08: 376 MS
VENTRICULAR RATE, ECG03: 51 BPM

## 2018-08-31 ENCOUNTER — HOSPITAL ENCOUNTER (OUTPATIENT)
Age: 73
Setting detail: OBSERVATION
Discharge: HOME | End: 2018-09-02
Attending: UROLOGY | Admitting: UROLOGY
Payer: MEDICARE

## 2018-08-31 ENCOUNTER — ANESTHESIA (OUTPATIENT)
Dept: SURGERY | Age: 73
End: 2018-08-31
Payer: MEDICARE

## 2018-08-31 DIAGNOSIS — C61 MALIGNANT NEOPLASM OF PROSTATE (HCC): Primary | ICD-10-CM

## 2018-08-31 LAB
GLUCOSE BLD STRIP.AUTO-MCNC: 134 MG/DL (ref 70–110)
GLUCOSE BLD STRIP.AUTO-MCNC: 158 MG/DL (ref 70–110)
GLUCOSE BLD STRIP.AUTO-MCNC: 164 MG/DL (ref 70–110)

## 2018-08-31 PROCEDURE — 77030010935 HC CLP LIG ABSRB TELE -B: Performed by: UROLOGY

## 2018-08-31 PROCEDURE — 77030013449 HC CLP LIG TELE -A: Performed by: UROLOGY

## 2018-08-31 PROCEDURE — 74011250637 HC RX REV CODE- 250/637: Performed by: STUDENT IN AN ORGANIZED HEALTH CARE EDUCATION/TRAINING PROGRAM

## 2018-08-31 PROCEDURE — 77030031139 HC SUT VCRL2 J&J -A: Performed by: UROLOGY

## 2018-08-31 PROCEDURE — 76210000016 HC OR PH I REC 1 TO 1.5 HR: Performed by: UROLOGY

## 2018-08-31 PROCEDURE — 74011636637 HC RX REV CODE- 636/637: Performed by: STUDENT IN AN ORGANIZED HEALTH CARE EDUCATION/TRAINING PROGRAM

## 2018-08-31 PROCEDURE — 77030008683 HC TU ET CUF COVD -A: Performed by: ANESTHESIOLOGY

## 2018-08-31 PROCEDURE — 77030022704 HC SUT VLOC COVD -B: Performed by: UROLOGY

## 2018-08-31 PROCEDURE — 88331 PATH CONSLTJ SURG 1 BLK 1SPC: CPT | Performed by: UROLOGY

## 2018-08-31 PROCEDURE — 74011250636 HC RX REV CODE- 250/636: Performed by: UROLOGY

## 2018-08-31 PROCEDURE — 77030009852 HC PCH RTVR ENDOSC COVD -B: Performed by: UROLOGY

## 2018-08-31 PROCEDURE — 77030010507 HC ADH SKN DERMBND J&J -B: Performed by: UROLOGY

## 2018-08-31 PROCEDURE — 77030014647 HC SEAL FBRN TISSL BAXT -D: Performed by: UROLOGY

## 2018-08-31 PROCEDURE — 77030032490 HC SLV COMPR SCD KNE COVD -B: Performed by: UROLOGY

## 2018-08-31 PROCEDURE — 76010000881 HC OR TIME 4.5 TO 5HR INTENSV - TIER 2: Performed by: UROLOGY

## 2018-08-31 PROCEDURE — 77030029684 HC NEB SM VOL KT MONA -A

## 2018-08-31 PROCEDURE — 74011250636 HC RX REV CODE- 250/636: Performed by: NURSE ANESTHETIST, CERTIFIED REGISTERED

## 2018-08-31 PROCEDURE — 99218 HC RM OBSERVATION: CPT

## 2018-08-31 PROCEDURE — 77030021678 HC GLIDESCP STAT DISP VERT -B: Performed by: ANESTHESIOLOGY

## 2018-08-31 PROCEDURE — 74011250636 HC RX REV CODE- 250/636: Performed by: STUDENT IN AN ORGANIZED HEALTH CARE EDUCATION/TRAINING PROGRAM

## 2018-08-31 PROCEDURE — 82962 GLUCOSE BLOOD TEST: CPT

## 2018-08-31 PROCEDURE — 94640 AIRWAY INHALATION TREATMENT: CPT

## 2018-08-31 PROCEDURE — 77030018813 HC SCIS LAPSCP EPIX DISP AMR -B: Performed by: UROLOGY

## 2018-08-31 PROCEDURE — 88309 TISSUE EXAM BY PATHOLOGIST: CPT | Performed by: UROLOGY

## 2018-08-31 PROCEDURE — 74011250636 HC RX REV CODE- 250/636

## 2018-08-31 PROCEDURE — 77030008771 HC TU NG SALEM SUMP -A: Performed by: NURSE ANESTHETIST, CERTIFIED REGISTERED

## 2018-08-31 PROCEDURE — 74011000250 HC RX REV CODE- 250: Performed by: UROLOGY

## 2018-08-31 PROCEDURE — 74011250637 HC RX REV CODE- 250/637: Performed by: NURSE ANESTHETIST, CERTIFIED REGISTERED

## 2018-08-31 PROCEDURE — 74011000272 HC RX REV CODE- 272: Performed by: UROLOGY

## 2018-08-31 PROCEDURE — 77030035277 HC OBTRTR BLDELSS DISP INTU -B: Performed by: UROLOGY

## 2018-08-31 PROCEDURE — 88304 TISSUE EXAM BY PATHOLOGIST: CPT | Performed by: UROLOGY

## 2018-08-31 PROCEDURE — 77030010939 HC CLP LIG TELE -B: Performed by: UROLOGY

## 2018-08-31 PROCEDURE — 77030008477 HC STYL SATN SLP COVD -A: Performed by: ANESTHESIOLOGY

## 2018-08-31 PROCEDURE — 77030016151 HC PROTCTR LNS DFOG COVD -B: Performed by: UROLOGY

## 2018-08-31 PROCEDURE — 77030010545: Performed by: UROLOGY

## 2018-08-31 PROCEDURE — 77030018842 HC SOL IRR SOD CL 9% BAXT -A: Performed by: UROLOGY

## 2018-08-31 PROCEDURE — 77030009848 HC PASSR SUT SET COOP -C: Performed by: UROLOGY

## 2018-08-31 PROCEDURE — 88305 TISSUE EXAM BY PATHOLOGIST: CPT | Performed by: UROLOGY

## 2018-08-31 PROCEDURE — 77030018846 HC SOL IRR STRL H20 ICUM -A: Performed by: UROLOGY

## 2018-08-31 PROCEDURE — 77030034696 HC CATH URETH FOL 2W BARD -A: Performed by: UROLOGY

## 2018-08-31 PROCEDURE — 77030035045 HC TRCR ENDOSC VRSPRT BLDLSS COVD -B: Performed by: UROLOGY

## 2018-08-31 PROCEDURE — 74011000250 HC RX REV CODE- 250

## 2018-08-31 PROCEDURE — 77030013079 HC BLNKT BAIR HGGR 3M -A: Performed by: NURSE ANESTHETIST, CERTIFIED REGISTERED

## 2018-08-31 PROCEDURE — 77030035048 HC TRCR ENDOSC OPTCL COVD -B: Performed by: UROLOGY

## 2018-08-31 PROCEDURE — 77030010513 HC APPL CLP LIG J&J -C: Performed by: UROLOGY

## 2018-08-31 PROCEDURE — C1729 CATH, DRAINAGE: HCPCS | Performed by: UROLOGY

## 2018-08-31 PROCEDURE — 77030008462 HC STPLR SKN PROX J&J -A: Performed by: UROLOGY

## 2018-08-31 PROCEDURE — 76060000040 HC ANESTHESIA 4.5 TO 5 HR: Performed by: UROLOGY

## 2018-08-31 RX ORDER — OXYBUTYNIN CHLORIDE 5 MG/1
5 TABLET ORAL 3 TIMES DAILY
Status: DISCONTINUED | OUTPATIENT
Start: 2018-08-31 | End: 2018-09-02 | Stop reason: HOSPADM

## 2018-08-31 RX ORDER — DIPHENHYDRAMINE HCL 25 MG
25 CAPSULE ORAL
Status: DISCONTINUED | OUTPATIENT
Start: 2018-08-31 | End: 2018-09-02 | Stop reason: HOSPADM

## 2018-08-31 RX ORDER — INSULIN LISPRO 100 [IU]/ML
INJECTION, SOLUTION INTRAVENOUS; SUBCUTANEOUS
Status: DISCONTINUED | OUTPATIENT
Start: 2018-08-31 | End: 2018-09-02 | Stop reason: HOSPADM

## 2018-08-31 RX ORDER — HYDROCHLOROTHIAZIDE 25 MG/1
25 TABLET ORAL DAILY
Status: DISCONTINUED | OUTPATIENT
Start: 2018-09-01 | End: 2018-09-02 | Stop reason: HOSPADM

## 2018-08-31 RX ORDER — GLYCOPYRROLATE 0.2 MG/ML
INJECTION INTRAMUSCULAR; INTRAVENOUS AS NEEDED
Status: DISCONTINUED | OUTPATIENT
Start: 2018-08-31 | End: 2018-08-31 | Stop reason: HOSPADM

## 2018-08-31 RX ORDER — OXYCODONE AND ACETAMINOPHEN 5; 325 MG/1; MG/1
1-2 TABLET ORAL
Status: DISCONTINUED | OUTPATIENT
Start: 2018-08-31 | End: 2018-09-02 | Stop reason: HOSPADM

## 2018-08-31 RX ORDER — MAGNESIUM SULFATE 100 %
4 CRYSTALS MISCELLANEOUS AS NEEDED
Status: DISCONTINUED | OUTPATIENT
Start: 2018-08-31 | End: 2018-09-02 | Stop reason: HOSPADM

## 2018-08-31 RX ORDER — MAGNESIUM SULFATE 100 %
4 CRYSTALS MISCELLANEOUS AS NEEDED
Status: DISCONTINUED | OUTPATIENT
Start: 2018-08-31 | End: 2018-08-31 | Stop reason: SDUPTHER

## 2018-08-31 RX ORDER — ALBUTEROL SULFATE 0.83 MG/ML
2.5 SOLUTION RESPIRATORY (INHALATION)
Status: DISCONTINUED | OUTPATIENT
Start: 2018-08-31 | End: 2018-09-02 | Stop reason: HOSPADM

## 2018-08-31 RX ORDER — BUPIVACAINE HYDROCHLORIDE 5 MG/ML
INJECTION, SOLUTION EPIDURAL; INTRACAUDAL AS NEEDED
Status: DISCONTINUED | OUTPATIENT
Start: 2018-08-31 | End: 2018-08-31 | Stop reason: HOSPADM

## 2018-08-31 RX ORDER — HYDROMORPHONE HYDROCHLORIDE 1 MG/ML
1 INJECTION, SOLUTION INTRAMUSCULAR; INTRAVENOUS; SUBCUTANEOUS
Status: DISCONTINUED | OUTPATIENT
Start: 2018-08-31 | End: 2018-09-02 | Stop reason: HOSPADM

## 2018-08-31 RX ORDER — FENTANYL CITRATE 50 UG/ML
50 INJECTION, SOLUTION INTRAMUSCULAR; INTRAVENOUS
Status: DISCONTINUED | OUTPATIENT
Start: 2018-08-31 | End: 2018-09-02 | Stop reason: HOSPADM

## 2018-08-31 RX ORDER — FAMOTIDINE 20 MG/1
20 TABLET, FILM COATED ORAL ONCE
Status: COMPLETED | OUTPATIENT
Start: 2018-08-31 | End: 2018-08-31

## 2018-08-31 RX ORDER — LIDOCAINE HYDROCHLORIDE 20 MG/ML
INJECTION, SOLUTION EPIDURAL; INFILTRATION; INTRACAUDAL; PERINEURAL AS NEEDED
Status: DISCONTINUED | OUTPATIENT
Start: 2018-08-31 | End: 2018-08-31 | Stop reason: HOSPADM

## 2018-08-31 RX ORDER — BUDESONIDE AND FORMOTEROL FUMARATE DIHYDRATE 160; 4.5 UG/1; UG/1
1 AEROSOL RESPIRATORY (INHALATION) 2 TIMES DAILY
Status: DISCONTINUED | OUTPATIENT
Start: 2018-08-31 | End: 2018-08-31 | Stop reason: CLARIF

## 2018-08-31 RX ORDER — ALBUTEROL SULFATE 90 UG/1
1 AEROSOL, METERED RESPIRATORY (INHALATION)
Status: DISCONTINUED | OUTPATIENT
Start: 2018-08-31 | End: 2018-08-31 | Stop reason: CLARIF

## 2018-08-31 RX ORDER — SODIUM CHLORIDE, SODIUM LACTATE, POTASSIUM CHLORIDE, CALCIUM CHLORIDE 600; 310; 30; 20 MG/100ML; MG/100ML; MG/100ML; MG/100ML
75 INJECTION, SOLUTION INTRAVENOUS CONTINUOUS
Status: DISCONTINUED | OUTPATIENT
Start: 2018-08-31 | End: 2018-09-02 | Stop reason: HOSPADM

## 2018-08-31 RX ORDER — LIDOCAINE HYDROCHLORIDE 10 MG/ML
0.1 INJECTION, SOLUTION EPIDURAL; INFILTRATION; INTRACAUDAL; PERINEURAL AS NEEDED
Status: DISCONTINUED | OUTPATIENT
Start: 2018-08-31 | End: 2018-08-31 | Stop reason: HOSPADM

## 2018-08-31 RX ORDER — ACETAMINOPHEN 325 MG/1
650 TABLET ORAL
Status: DISCONTINUED | OUTPATIENT
Start: 2018-08-31 | End: 2018-09-02 | Stop reason: HOSPADM

## 2018-08-31 RX ORDER — ATROPINE SULFATE 0.4 MG/ML
INJECTION, SOLUTION ENDOTRACHEAL; INTRAMEDULLARY; INTRAMUSCULAR; INTRAVENOUS; SUBCUTANEOUS AS NEEDED
Status: DISCONTINUED | OUTPATIENT
Start: 2018-08-31 | End: 2018-08-31 | Stop reason: HOSPADM

## 2018-08-31 RX ORDER — DEXTROSE MONOHYDRATE 25 G/50ML
25-50 INJECTION, SOLUTION INTRAVENOUS AS NEEDED
Status: DISCONTINUED | OUTPATIENT
Start: 2018-08-31 | End: 2018-08-31 | Stop reason: SDUPTHER

## 2018-08-31 RX ORDER — MIDAZOLAM HYDROCHLORIDE 1 MG/ML
INJECTION, SOLUTION INTRAMUSCULAR; INTRAVENOUS AS NEEDED
Status: DISCONTINUED | OUTPATIENT
Start: 2018-08-31 | End: 2018-08-31 | Stop reason: HOSPADM

## 2018-08-31 RX ORDER — SODIUM CHLORIDE 0.9 % (FLUSH) 0.9 %
5-10 SYRINGE (ML) INJECTION EVERY 8 HOURS
Status: DISCONTINUED | OUTPATIENT
Start: 2018-08-31 | End: 2018-09-02 | Stop reason: HOSPADM

## 2018-08-31 RX ORDER — INSULIN LISPRO 100 [IU]/ML
INJECTION, SOLUTION INTRAVENOUS; SUBCUTANEOUS ONCE
Status: DISCONTINUED | OUTPATIENT
Start: 2018-08-31 | End: 2018-08-31 | Stop reason: HOSPADM

## 2018-08-31 RX ORDER — FENTANYL CITRATE 50 UG/ML
INJECTION, SOLUTION INTRAMUSCULAR; INTRAVENOUS AS NEEDED
Status: DISCONTINUED | OUTPATIENT
Start: 2018-08-31 | End: 2018-08-31 | Stop reason: HOSPADM

## 2018-08-31 RX ORDER — OXYCODONE AND ACETAMINOPHEN 5; 325 MG/1; MG/1
1 TABLET ORAL
Qty: 30 TAB | Refills: 0 | Status: SHIPPED | OUTPATIENT
Start: 2018-08-31 | End: 2018-09-28

## 2018-08-31 RX ORDER — DEXAMETHASONE SODIUM PHOSPHATE 4 MG/ML
INJECTION, SOLUTION INTRA-ARTICULAR; INTRALESIONAL; INTRAMUSCULAR; INTRAVENOUS; SOFT TISSUE AS NEEDED
Status: DISCONTINUED | OUTPATIENT
Start: 2018-08-31 | End: 2018-08-31 | Stop reason: HOSPADM

## 2018-08-31 RX ORDER — DOCUSATE SODIUM 100 MG/1
100 CAPSULE, LIQUID FILLED ORAL 2 TIMES DAILY
Qty: 30 CAP | Refills: 0 | Status: SHIPPED | OUTPATIENT
Start: 2018-08-31 | End: 2018-09-15

## 2018-08-31 RX ORDER — CEFAZOLIN SODIUM 2 G/50ML
2 SOLUTION INTRAVENOUS EVERY 8 HOURS
Status: COMPLETED | OUTPATIENT
Start: 2018-08-31 | End: 2018-09-01

## 2018-08-31 RX ORDER — CEFAZOLIN SODIUM 2 G/50ML
2 SOLUTION INTRAVENOUS
Status: COMPLETED | OUTPATIENT
Start: 2018-08-31 | End: 2018-08-31

## 2018-08-31 RX ORDER — VECURONIUM BROMIDE FOR INJECTION 1 MG/ML
INJECTION, POWDER, LYOPHILIZED, FOR SOLUTION INTRAVENOUS AS NEEDED
Status: DISCONTINUED | OUTPATIENT
Start: 2018-08-31 | End: 2018-08-31 | Stop reason: HOSPADM

## 2018-08-31 RX ORDER — OXYBUTYNIN CHLORIDE 5 MG/1
5 TABLET ORAL
Qty: 20 TAB | Refills: 0 | Status: SHIPPED | OUTPATIENT
Start: 2018-08-31 | End: 2019-05-08

## 2018-08-31 RX ORDER — SUCCINYLCHOLINE CHLORIDE 20 MG/ML
INJECTION INTRAMUSCULAR; INTRAVENOUS AS NEEDED
Status: DISCONTINUED | OUTPATIENT
Start: 2018-08-31 | End: 2018-08-31 | Stop reason: HOSPADM

## 2018-08-31 RX ORDER — PANTOPRAZOLE SODIUM 40 MG/1
40 TABLET, DELAYED RELEASE ORAL
Status: DISCONTINUED | OUTPATIENT
Start: 2018-09-01 | End: 2018-09-02 | Stop reason: HOSPADM

## 2018-08-31 RX ORDER — LISINOPRIL 20 MG/1
20 TABLET ORAL DAILY
Status: DISCONTINUED | OUTPATIENT
Start: 2018-09-01 | End: 2018-09-02 | Stop reason: HOSPADM

## 2018-08-31 RX ORDER — FENTANYL CITRATE 50 UG/ML
INJECTION, SOLUTION INTRAMUSCULAR; INTRAVENOUS AS NEEDED
Status: DISCONTINUED | OUTPATIENT
Start: 2018-08-31 | End: 2018-08-31

## 2018-08-31 RX ORDER — LEVOTHYROXINE SODIUM 25 UG/1
50 TABLET ORAL
Status: DISCONTINUED | OUTPATIENT
Start: 2018-09-01 | End: 2018-09-02 | Stop reason: HOSPADM

## 2018-08-31 RX ORDER — FENTANYL CITRATE 50 UG/ML
25 INJECTION, SOLUTION INTRAMUSCULAR; INTRAVENOUS
Status: DISCONTINUED | OUTPATIENT
Start: 2018-08-31 | End: 2018-09-02 | Stop reason: HOSPADM

## 2018-08-31 RX ORDER — NALOXONE HYDROCHLORIDE 0.4 MG/ML
0.4 INJECTION, SOLUTION INTRAMUSCULAR; INTRAVENOUS; SUBCUTANEOUS AS NEEDED
Status: DISCONTINUED | OUTPATIENT
Start: 2018-08-31 | End: 2018-09-02 | Stop reason: HOSPADM

## 2018-08-31 RX ORDER — ONDANSETRON 4 MG/1
4 TABLET, ORALLY DISINTEGRATING ORAL
Status: DISCONTINUED | OUTPATIENT
Start: 2018-08-31 | End: 2018-09-02 | Stop reason: HOSPADM

## 2018-08-31 RX ORDER — NEOSTIGMINE METHYLSULFATE 5 MG/5 ML
SYRINGE (ML) INTRAVENOUS AS NEEDED
Status: DISCONTINUED | OUTPATIENT
Start: 2018-08-31 | End: 2018-08-31 | Stop reason: HOSPADM

## 2018-08-31 RX ORDER — INSULIN LISPRO 100 [IU]/ML
INJECTION, SOLUTION INTRAVENOUS; SUBCUTANEOUS ONCE
Status: ACTIVE | OUTPATIENT
Start: 2018-08-31 | End: 2018-09-01

## 2018-08-31 RX ORDER — NALOXONE HYDROCHLORIDE 0.4 MG/ML
0.2 INJECTION, SOLUTION INTRAMUSCULAR; INTRAVENOUS; SUBCUTANEOUS AS NEEDED
Status: DISCONTINUED | OUTPATIENT
Start: 2018-08-31 | End: 2018-09-02 | Stop reason: HOSPADM

## 2018-08-31 RX ORDER — HEPARIN SODIUM 5000 [USP'U]/ML
5000 INJECTION, SOLUTION INTRAVENOUS; SUBCUTANEOUS
Status: COMPLETED | OUTPATIENT
Start: 2018-08-31 | End: 2018-08-31

## 2018-08-31 RX ORDER — BUDESONIDE 0.5 MG/2ML
500 INHALANT ORAL
Status: DISCONTINUED | OUTPATIENT
Start: 2018-08-31 | End: 2018-09-02 | Stop reason: HOSPADM

## 2018-08-31 RX ORDER — HYDROMORPHONE HYDROCHLORIDE 1 MG/ML
INJECTION, SOLUTION INTRAMUSCULAR; INTRAVENOUS; SUBCUTANEOUS AS NEEDED
Status: DISCONTINUED | OUTPATIENT
Start: 2018-08-31 | End: 2018-08-31 | Stop reason: HOSPADM

## 2018-08-31 RX ORDER — DEXTROSE MONOHYDRATE 25 G/50ML
25-50 INJECTION, SOLUTION INTRAVENOUS AS NEEDED
Status: DISCONTINUED | OUTPATIENT
Start: 2018-08-31 | End: 2018-09-02 | Stop reason: HOSPADM

## 2018-08-31 RX ORDER — SODIUM CHLORIDE, SODIUM LACTATE, POTASSIUM CHLORIDE, CALCIUM CHLORIDE 600; 310; 30; 20 MG/100ML; MG/100ML; MG/100ML; MG/100ML
100 INJECTION, SOLUTION INTRAVENOUS CONTINUOUS
Status: DISCONTINUED | OUTPATIENT
Start: 2018-08-31 | End: 2018-09-02

## 2018-08-31 RX ORDER — EPHEDRINE SULFATE 50 MG/ML
INJECTION, SOLUTION INTRAVENOUS AS NEEDED
Status: DISCONTINUED | OUTPATIENT
Start: 2018-08-31 | End: 2018-08-31 | Stop reason: HOSPADM

## 2018-08-31 RX ORDER — PROPOFOL 10 MG/ML
INJECTION, EMULSION INTRAVENOUS AS NEEDED
Status: DISCONTINUED | OUTPATIENT
Start: 2018-08-31 | End: 2018-08-31 | Stop reason: HOSPADM

## 2018-08-31 RX ORDER — SODIUM CHLORIDE, SODIUM LACTATE, POTASSIUM CHLORIDE, CALCIUM CHLORIDE 600; 310; 30; 20 MG/100ML; MG/100ML; MG/100ML; MG/100ML
25 INJECTION, SOLUTION INTRAVENOUS CONTINUOUS
Status: DISCONTINUED | OUTPATIENT
Start: 2018-08-31 | End: 2018-08-31 | Stop reason: HOSPADM

## 2018-08-31 RX ORDER — ARFORMOTEROL TARTRATE 15 UG/2ML
15 SOLUTION RESPIRATORY (INHALATION)
Status: DISCONTINUED | OUTPATIENT
Start: 2018-08-31 | End: 2018-09-02 | Stop reason: HOSPADM

## 2018-08-31 RX ORDER — ONDANSETRON 2 MG/ML
INJECTION INTRAMUSCULAR; INTRAVENOUS AS NEEDED
Status: DISCONTINUED | OUTPATIENT
Start: 2018-08-31 | End: 2018-08-31 | Stop reason: HOSPADM

## 2018-08-31 RX ORDER — ONDANSETRON 2 MG/ML
4 INJECTION INTRAMUSCULAR; INTRAVENOUS
Status: ACTIVE | OUTPATIENT
Start: 2018-08-31 | End: 2018-09-01

## 2018-08-31 RX ORDER — DILTIAZEM HYDROCHLORIDE 120 MG/1
240 CAPSULE, COATED, EXTENDED RELEASE ORAL DAILY
Status: DISCONTINUED | OUTPATIENT
Start: 2018-09-01 | End: 2018-09-02 | Stop reason: HOSPADM

## 2018-08-31 RX ORDER — HEPARIN SODIUM 5000 [USP'U]/ML
5000 INJECTION, SOLUTION INTRAVENOUS; SUBCUTANEOUS EVERY 8 HOURS
Status: DISCONTINUED | OUTPATIENT
Start: 2018-08-31 | End: 2018-09-02 | Stop reason: HOSPADM

## 2018-08-31 RX ORDER — SODIUM CHLORIDE 0.9 % (FLUSH) 0.9 %
5-10 SYRINGE (ML) INJECTION AS NEEDED
Status: DISCONTINUED | OUTPATIENT
Start: 2018-08-31 | End: 2018-09-02 | Stop reason: HOSPADM

## 2018-08-31 RX ORDER — SODIUM CHLORIDE, SODIUM LACTATE, POTASSIUM CHLORIDE, CALCIUM CHLORIDE 600; 310; 30; 20 MG/100ML; MG/100ML; MG/100ML; MG/100ML
INJECTION, SOLUTION INTRAVENOUS
Status: DISCONTINUED | OUTPATIENT
Start: 2018-08-31 | End: 2018-08-31 | Stop reason: HOSPADM

## 2018-08-31 RX ADMIN — FENTANYL CITRATE 50 MCG: 50 INJECTION, SOLUTION INTRAMUSCULAR; INTRAVENOUS at 12:58

## 2018-08-31 RX ADMIN — FENTANYL CITRATE 50 MCG: 50 INJECTION, SOLUTION INTRAMUSCULAR; INTRAVENOUS at 13:06

## 2018-08-31 RX ADMIN — PROPOFOL 130 MG: 10 INJECTION, EMULSION INTRAVENOUS at 12:58

## 2018-08-31 RX ADMIN — INSULIN LISPRO 2 UNITS: 100 INJECTION, SOLUTION INTRAVENOUS; SUBCUTANEOUS at 21:52

## 2018-08-31 RX ADMIN — FENTANYL CITRATE 50 MCG: 50 INJECTION, SOLUTION INTRAMUSCULAR; INTRAVENOUS at 18:00

## 2018-08-31 RX ADMIN — DEXAMETHASONE SODIUM PHOSPHATE 4 MG: 4 INJECTION, SOLUTION INTRA-ARTICULAR; INTRALESIONAL; INTRAMUSCULAR; INTRAVENOUS; SOFT TISSUE at 13:15

## 2018-08-31 RX ADMIN — GLYCOPYRROLATE 0.2 MG: 0.2 INJECTION INTRAMUSCULAR; INTRAVENOUS at 13:29

## 2018-08-31 RX ADMIN — FENTANYL CITRATE 50 MCG: 50 INJECTION, SOLUTION INTRAMUSCULAR; INTRAVENOUS at 18:10

## 2018-08-31 RX ADMIN — VECURONIUM BROMIDE FOR INJECTION 3 MG: 1 INJECTION, POWDER, LYOPHILIZED, FOR SOLUTION INTRAVENOUS at 14:18

## 2018-08-31 RX ADMIN — MIDAZOLAM HYDROCHLORIDE 2 MG: 1 INJECTION, SOLUTION INTRAMUSCULAR; INTRAVENOUS at 12:54

## 2018-08-31 RX ADMIN — HYDROMORPHONE HYDROCHLORIDE 0.25 MG: 1 INJECTION, SOLUTION INTRAMUSCULAR; INTRAVENOUS; SUBCUTANEOUS at 17:16

## 2018-08-31 RX ADMIN — EPHEDRINE SULFATE 5 MG: 50 INJECTION, SOLUTION INTRAVENOUS at 15:40

## 2018-08-31 RX ADMIN — SODIUM CHLORIDE, SODIUM LACTATE, POTASSIUM CHLORIDE, AND CALCIUM CHLORIDE 75 ML/HR: 600; 310; 30; 20 INJECTION, SOLUTION INTRAVENOUS at 19:59

## 2018-08-31 RX ADMIN — HEPARIN SODIUM 5000 UNITS: 5000 INJECTION INTRAVENOUS; SUBCUTANEOUS at 10:52

## 2018-08-31 RX ADMIN — SODIUM CHLORIDE, SODIUM LACTATE, POTASSIUM CHLORIDE, AND CALCIUM CHLORIDE 100 ML/HR: 600; 310; 30; 20 INJECTION, SOLUTION INTRAVENOUS at 19:32

## 2018-08-31 RX ADMIN — Medication 10 ML: at 22:00

## 2018-08-31 RX ADMIN — ARFORMOTEROL TARTRATE 15 MCG: 15 SOLUTION RESPIRATORY (INHALATION) at 20:19

## 2018-08-31 RX ADMIN — CEFAZOLIN SODIUM 2 G: 2 SOLUTION INTRAVENOUS at 13:05

## 2018-08-31 RX ADMIN — CEFAZOLIN SODIUM 2 G: 2 SOLUTION INTRAVENOUS at 21:52

## 2018-08-31 RX ADMIN — SUCCINYLCHOLINE CHLORIDE 100 MG: 20 INJECTION INTRAMUSCULAR; INTRAVENOUS at 12:58

## 2018-08-31 RX ADMIN — VECURONIUM BROMIDE FOR INJECTION 4 MG: 1 INJECTION, POWDER, LYOPHILIZED, FOR SOLUTION INTRAVENOUS at 13:06

## 2018-08-31 RX ADMIN — LIDOCAINE HYDROCHLORIDE 60 MG: 20 INJECTION, SOLUTION EPIDURAL; INFILTRATION; INTRACAUDAL; PERINEURAL at 12:58

## 2018-08-31 RX ADMIN — SODIUM CHLORIDE, SODIUM LACTATE, POTASSIUM CHLORIDE, AND CALCIUM CHLORIDE: 600; 310; 30; 20 INJECTION, SOLUTION INTRAVENOUS at 12:40

## 2018-08-31 RX ADMIN — OXYCODONE HYDROCHLORIDE AND ACETAMINOPHEN 1 TABLET: 5; 325 TABLET ORAL at 19:01

## 2018-08-31 RX ADMIN — FAMOTIDINE 20 MG: 20 TABLET ORAL at 10:51

## 2018-08-31 RX ADMIN — HYDROMORPHONE HYDROCHLORIDE 0.5 MG: 1 INJECTION, SOLUTION INTRAMUSCULAR; INTRAVENOUS; SUBCUTANEOUS at 14:23

## 2018-08-31 RX ADMIN — HEPARIN SODIUM 5000 UNITS: 5000 INJECTION INTRAVENOUS; SUBCUTANEOUS at 19:34

## 2018-08-31 RX ADMIN — ATROPINE SULFATE 0.4 MG: 0.4 INJECTION, SOLUTION ENDOTRACHEAL; INTRAMEDULLARY; INTRAMUSCULAR; INTRAVENOUS; SUBCUTANEOUS at 13:34

## 2018-08-31 RX ADMIN — SODIUM CHLORIDE, SODIUM LACTATE, POTASSIUM CHLORIDE, CALCIUM CHLORIDE: 600; 310; 30; 20 INJECTION, SOLUTION INTRAVENOUS at 13:00

## 2018-08-31 RX ADMIN — BUDESONIDE 500 MCG: 0.5 INHALANT RESPIRATORY (INHALATION) at 20:19

## 2018-08-31 RX ADMIN — OXYBUTYNIN CHLORIDE 5 MG: 5 TABLET ORAL at 19:01

## 2018-08-31 RX ADMIN — FENTANYL CITRATE 50 MCG: 50 INJECTION, SOLUTION INTRAMUSCULAR; INTRAVENOUS at 17:50

## 2018-08-31 RX ADMIN — VECURONIUM BROMIDE FOR INJECTION 3 MG: 1 INJECTION, POWDER, LYOPHILIZED, FOR SOLUTION INTRAVENOUS at 14:39

## 2018-08-31 RX ADMIN — HYDROMORPHONE HYDROCHLORIDE 0.25 MG: 1 INJECTION, SOLUTION INTRAMUSCULAR; INTRAVENOUS; SUBCUTANEOUS at 17:14

## 2018-08-31 RX ADMIN — VECURONIUM BROMIDE FOR INJECTION 2 MG: 1 INJECTION, POWDER, LYOPHILIZED, FOR SOLUTION INTRAVENOUS at 13:47

## 2018-08-31 RX ADMIN — GLYCOPYRROLATE 0.4 MG: 0.2 INJECTION INTRAMUSCULAR; INTRAVENOUS at 17:08

## 2018-08-31 RX ADMIN — Medication 3 MG: at 17:08

## 2018-08-31 RX ADMIN — EPHEDRINE SULFATE 5 MG: 50 INJECTION, SOLUTION INTRAVENOUS at 13:32

## 2018-08-31 RX ADMIN — PROPOFOL 50 MG: 10 INJECTION, EMULSION INTRAVENOUS at 13:06

## 2018-08-31 RX ADMIN — ONDANSETRON 4 MG: 2 INJECTION INTRAMUSCULAR; INTRAVENOUS at 13:15

## 2018-08-31 NOTE — INTERVAL H&P NOTE
H&P Update:  Zeferino Jones was seen and examined. History and physical has been reviewed. The patient has been examined. There have been no significant clinical changes since the completion of the originally dated History and Physical.  Patient identified by surgeon; surgical site was confirmed by patient and surgeon.     Signed By: Chana Ritchie MD     August 31, 2018 12:04 PM

## 2018-08-31 NOTE — H&P (VIEW-ONLY)
Urology Preop History and Physical Exam 
 
8/24/2018, 2:09 PM 
Ana Ann is a 68 y.o. male [birth year] MRN: [id number] PLANNED PROCEDURE:  
Robotic assisted laparoscopic prostatectomy CONSULTS OBTAINED PRE-OPERATIVELY:  
Cleared for surgery, per pt. Will obtain official records from PCP. SUBJECTIVE:  
 
CC: [id number] GS 4+4 Prostate cancer HPI: Ana Ann is a 68 y.o. Male who presents for above procedure. Pt underwent prostate biopsy on 6/15/18 for elevated PSA of 7.4, which revealed 5/12 cores positive for cancer, 4 of these cores positive on the right with GS 4+4 disease where a nodule was felt on exam.  
He has no family history of prostate cancer. Staging CT and bone scan 7/19/18 showed no evidence of metastatic disease.  
  
Pt reports that he has been doing well and would like to proceed with RALP as scheduled. He denies any changes in his symptoms since he was last seen. He notes that he has seen his PCP and was cleared for surgery, however, he is not sure when his last EKG was. He also says he met with PT yesterday for post-op AILYN counseling.   
  
Baseline urinary symptoms include nocturia x1-2, otherwise, he denies any bothersome voiding complaints. Currently on no  medications. ECOG PS: 0 Past Medical History:  
Diagnosis Date  Diabetes mellitus ([address]  Elevated PSA  Hypercholesteremia  Hypertension  Nodular prostate without urinary obstruction Past Surgical History:  
Procedure Laterality Date  HX UROLOGICAL N/A 06/15/2018 PNBx-TRUS Vol. 35.6cc's, Sherwin 8(4+4) R mid/R base/R lateral mid/R lateral base, Sherwin 6(3+3) L latera base -- Dr Jovita Ferris Family History Problem Relation Age of Onset  Family history unknown: Yes  
 
 
Social History Social History  Marital status: SINGLE Spouse name: N/A  
 Number of children: N/A  
 Years of education: N/A Social History Main Topics  Smoking status: Former Smoker Packs/day: 0.50 Years: 40.00 Types: Cigarettes  Smokeless tobacco: Former User Quit date: 7/7/2009 Comment: Prior to quiting 1 pack per day  Alcohol use Yes Comment: occasionally  Drug use: No  
 Sexual activity: Not Asked Other Topics Concern  None Social History Narrative Current Outpatient Prescriptions Medication Sig Dispense Refill  atorvastatin (LIPITOR) 40 mg tablet  azithromycin (ZITHROMAX) 250 mg tablet  SYMBICORT 160-4.5 mcg/actuation HFAA  FLOVENT  mcg/actuation inhaler  glimepiride (AMARYL) 4 mg tablet  hydroCHLOROthiazide (HYDRODIURIL) 25 mg tablet  levothyroxine (SYNTHROID) 50 mcg tablet  pantoprazole (PROTONIX) 40 mg tablet  tamsulosin (FLOMAX) 0.4 mg capsule  triamcinolone acetonide (KENALOG) 0.1 % topical cream     
 albuterol (PROVENTIL HFA) 90 mcg/actuation inhaler Take 1 Puff by inhalation every four (4) hours as needed for Wheezing. 1 Inhaler 1  
 diltiazem XR (DILACOR XR) 240 mg XR capsule Take  by mouth daily.  aspirin delayed-release 81 mg tablet Take  by mouth daily.  lisinopril (PRINIVIL) 20 mg tablet Take  by mouth daily.  glipiZIDE (GLUCOTROL) 10 mg tablet Take 10 mg by mouth two (2) times a day. No Known Allergies Review of Systems Constitutional: Fever: No 
Skin: Rash: No 
HEENT: Hearing difficulty: No 
Eyes: Blurred vision: No 
Cardiovascular: Chest pain: No 
Respiratory: Shortness of breath: No 
Gastrointestinal: Nausea/vomiting: No 
Musculoskeletal: Back pain: No 
Neurological: Weakness: No 
Psychological: Memory loss: No 
Comments/additional findings: OBJECTIVE:  
 
Vitals:  
 08/24/18 1341 BP: 134/74 Weight: 173 lb (78.5 kg) Height: 5' 11\" (1.803 m) Gen: Patient is in NAD Pulm: Equal respiratory effort bilaterally, CTAB 
CV: regular rate and rhythm Abd: soft, nontender, non-distended Ext: No clubbing, cyanosis or edema Neuro: Grossly intact Skin: warm and dry LAB AND RADIOLOGY:  
 
Results for orders placed or performed in visit on 08/24/18 AMB POC URINALYSIS DIP STICK AUTO W/O MICRO Result Value Ref Range Color (UA POC) Yellow Clarity (UA POC) Clear Glucose (UA POC) Negative Negative Bilirubin (UA POC) Negative Negative Ketones (UA POC) Negative Negative Specific gravity (UA POC) 1.020 1.001 - 1.035 Blood (UA POC) Negative Negative pH (UA POC) 5.5 4.6 - 8.0 Protein (UA POC) 1+ Negative Urobilinogen (UA POC) 0.2 mg/dL 0.2 - 1 Nitrites (UA POC) Negative Negative Leukocyte esterase (UA POC) Negative Negative IMAGING:  
CT A/P w/o contrast 7/19/2018 IMPRESSION: 
1.  No CT findings metastatic disease within the abdomen or pelvis. 2.  Osseous degenerative changes as described. 3.  Punctate densities lower pole right kidney, possibly submillimeter forming stones. 4.  Atherosclerosis including coronary arterial calcification. 
  
Bone scan  7/19/2018 IMPRESSION No typical scintigraphic evidence of osseous metastatic disease. Other foci of uptake as described. 
   
 
 
ASSESSMENT:  
1. Pt is a 68 y.o. male who presents for pre-operative visit for RALP with a hx of [id number] GS 4+4, Prostate cancer in 5/12 cores (Dx 6/15/18), prostate volume of 35.6 cm3, Pre-biopsy PSA= 7.4 ng/ml on 5/9/18. Staging CT A/P and bone scan 7/19/18 showed no evidence of metastatic disease. 
  
2. HTN. On ASA 81. 
3. DM 
4. CKD - creatinine 1.78 on 8/1/17. PLAN:  
· Will obtain official records of pre-op labs, EKG, and CXR from PCP. Per pt, he was cleared for surgery but not sure when his last EKG was. Will order EKG if none recently. · Risks and benefits of surgery fully discussed today · Consents signed today · All questions answered · Plan for RALP as scheduled on 8/31/18. He has had extensive counseling on treatment options and r/b of each. He desires surgery. We again reviewed r/b of surgery including but not limited to impact on QOL-- most prominently on urinary, bowel, and sexual function. We discussed potential for short and long term urinary incontinence and erectile dysfunction. We discussed potential for surgical complications including infection, bleeding, blood transfusion, injury to urinary and surrounding structures including rectum which could require primary repair or diverting colostomy, vascular injury, neuromuscular injury related to positioning, penile shortening, and bladder neck contracture. We discussed the possibility of positive margins and possible need for adjuvant or salvage therapies in the future. We discussed other perioperative risks including DVT, PE, CVA, MI, pneumonia, and death. He is well informed and all questions were answered. Arminda Enriquez MD 
, Dept of Urology [employer] [facility] [place], [place] Pager #: [phone] CC: Khloe Powell MD 
 
Medical documentation entered into the chart by Naga Santana medical scribe for Vijaya Cardenas MD on 8/24/2018.

## 2018-08-31 NOTE — IP AVS SNAPSHOT
Aurea Ramirez 
 
 
 [address] [place] [address] Patient: Verner Cristal MRN: [id number] SLQ:9[birth year]48/9904 A check hilda indicates which time of day the medication should be taken. My Medications START taking these medications Instructions Each Dose to Equal  
 Morning Noon Evening Bedtime  
 docusate sodium 100 mg capsule Commonly known as:  Raul Call Your last dose was: Your next dose is: Take 1 Cap by mouth two (2) times a day for 15 days. 100 mg  
    
   
   
   
  
 oxybutynin 5 mg tablet Commonly known as:  LQDNFZVJ Your last dose was: Your next dose is: Take 1 Tab by mouth three (3) times daily as needed. Indications: Bladder Hyperactivity, URINARY URGENCY  
 5 mg  
    
   
   
   
  
 oxyCODONE-acetaminophen 5-325 mg per tablet Commonly known as:  PERCOCET Your last dose was: Your next dose is: Take 1 Tab by mouth every four (4) hours as needed for Pain. Max Daily Amount: 6 Tabs. 1 Tab CONTINUE taking these medications Instructions Each Dose to Equal  
 Morning Noon Evening Bedtime  
 albuterol 90 mcg/actuation inhaler Commonly known as:  PROVENTIL HFA Your last dose was: Your next dose is: Take 1 Puff by inhalation every four (4) hours as needed for Wheezing. 1 Puff  
    
   
   
   
  
 aspirin delayed-release 81 mg tablet Your last dose was: Your next dose is: Take  by mouth daily. atorvastatin 40 mg tablet Commonly known as:  LIPITOR Your last dose was: Your next dose is:    
   
   
      
   
   
   
  
 dilTIAZem  mg XR capsule Commonly known as:  DILACOR XR Your last dose was: Your next dose is: Take  by mouth daily. FLOVENT  mcg/actuation inhaler Generic drug:  fluticasone Your last dose was: Your next dose is:    
   
   
      
   
   
   
  
 glimepiride 4 mg tablet Commonly known as:  AMARYL Your last dose was: Your next dose is:    
   
   
      
   
   
   
  
 GLUCOTROL 10 mg tablet Generic drug:  glipiZIDE Your last dose was: Your next dose is: Take 10 mg by mouth two (2) times a day. 10 mg  
    
   
   
   
  
 hydroCHLOROthiazide 25 mg tablet Commonly known as:  HYDRODIURIL Your last dose was: Your next dose is:    
   
   
      
   
   
   
  
 levothyroxine 50 mcg tablet Commonly known as:  SYNTHROID Your last dose was: Your next dose is:    
   
   
      
   
   
   
  
 pantoprazole 40 mg tablet Commonly known as:  PROTONIX Your last dose was: Your next dose is: PRINIVIL 20 mg tablet Generic drug:  lisinopril Your last dose was: Your next dose is: Take  by mouth daily. SYMBICORT 160-4.5 mcg/actuation Hfaa Generic drug:  budesonide-formoterol Your last dose was: Your next dose is:    
   
   
      
   
   
   
  
 triamcinolone acetonide 0.1 % topical cream  
Commonly known as:  KENALOG Your last dose was: Your next dose is:    
   
   
      
   
   
   
  
  
  
Where to Get Your Medications Information on where to get these meds will be given to you by the nurse or doctor. ! Ask your nurse or doctor about these medications  
  docusate sodium 100 mg capsule  
 oxybutynin 5 mg tablet  
 oxyCODONE-acetaminophen 5-325 mg per tablet

## 2018-08-31 NOTE — PERIOP NOTES
TRANSFER - OUT REPORT:    Verbal report given to delia hernandez(name) on Effie Reardon  being transferred to 2204(unit) for routine post - op       Report consisted of patients Situation, Background, Assessment and   Recommendations(SBAR). Information from the following report(s) SBAR, OR Summary, Intake/Output and MAR was reviewed with the receiving nurse. Lines:   Peripheral IV 08/31/18 Right Hand (Active)   Site Assessment Clean, dry, & intact 8/31/2018  6:00 PM   Phlebitis Assessment 0 8/31/2018  6:00 PM   Infiltration Assessment 0 8/31/2018  6:00 PM   Dressing Status Clean, dry, & intact 8/31/2018  6:00 PM   Dressing Type Transparent 8/31/2018  6:00 PM       Peripheral IV 08/31/18 Left Arm (Active)   Site Assessment Clean, dry, & intact 8/31/2018  6:00 PM   Phlebitis Assessment 0 8/31/2018  6:00 PM   Infiltration Assessment 0 8/31/2018  6:00 PM   Dressing Status Clean, dry, & intact 8/31/2018  6:00 PM   Dressing Type Transparent;Tape 8/31/2018  6:00 PM        Opportunity for questions and clarification was provided.       Patient transported with:   Registered Nurse

## 2018-08-31 NOTE — ROUTINE PROCESS
Patient arrived from [facility] on hospital bed. Heck to sd draining guerline wine colored urine. Patient is very sleepy nods to answers and will open eyes on occasion. Family at bedside answering [facility] questions.

## 2018-08-31 NOTE — PERIOP NOTES
Patient transferred to [facility] [id number] in good condition family updated on status

## 2018-08-31 NOTE — PERIOP NOTES
Patient received from OR report taken patient restless attempt to get OOB ,confused to time place ,will continue to reorient patient.

## 2018-08-31 NOTE — OP NOTES
OPERATIVE NOTE    Jennifer Baig  [birth year]  MRN: [id number]    LOCATION OF PATIENT:  [facility]    DATE OF OPERATION:  8/31/2018    PREOPERATIVE DIAGNOSIS:  Prostate cancer. POSTOPERATIVE DIAGNOSIS:  Prostate cancer. OPERATION PERFORMED:  Robotic assisted robotic radical prostatectomy non-nerve sparing  Robotic assisted bilateral pelvic lymph node dissection    SURGEON:  Miguelito Fuller MD.    ASSISTANT SURGEON:  Juanita Kam (fellow)  [facility] Juan Joseiathea PGY4    ANESTHESIA:  General endotracheal anesthesia. ESTIMATED BLOOD LOSS:  200 cc    IVFs:   1500 cc crystalloid    DRAINS:  18 Fr lucia    COMPLICATIONS:  None    CONDITION:  stable    INDICATIONS FOR OPERATION:  The patient is a 68 y.o. man who was found to have an elevated serum PSA at 7.4. The patient underwent a prostate ultrasound with echo-guided needle biopsy and was found to have 5 biopsy core out of 12 was found to have Sherwin Grade 4+4 adenocarcinoma. Staging images was negative. The patient was thoroughly and fully counseled about risks, options, benefits and alternatives for the definitive treatment of clinically-localized prostate cancer, and he selected to be treated with Da Arabella robotic laparoscopic prostatectomy. FINDINGS:  Accessory Pudendal artery:  none. No evidence of extraprostatic extention. Specimen to Pathology:   Prostate and seminal vesicles  Periprostatic lymph node  Right and left pelvic lymph nodes  Bladder neck margin (frozen)  Bladder neck margin #2  Bladder neck margin #3    Operative Findings: The laparoscopic exam showed  no abnormalities, no visceral injuries, and no visible evidence of extraprostatic disease. DESCRIPTION OF OPERATION:  After informed consent was obtained, the patient was placed in  the operating room in the supine position.  After induction with  general endotracheal anesthesia, the arms were tucked to the  sides and carefully padded with eggcrate, as well as rolls on the  hands to keep the hands in neutral position bilateral.  Sequential compression devices were placed on the lower  extremities, and the legs were placed in low lithotomy in the  yellow thin stirrups. The patient was placed in steep  Trendelenburg position. An orogastric tube was placed. Erich Irons was placed over the upper torso, and a Mclean stand placed  over the upper torso to prevent instruments from falling near the  airway. All pressure points were carefully inspected and padded  in the final surgical position. The abdomen and genitalia were  then prepped and draped in the usual sterile manner. A Veress needle was placed in the supraumbilical  location, and saline drop test used to confirm patency, and  pneumoperitoneum was established to a maximum pressure of 15 mmHg  CO2 pressure. Supraumbilical incision was made, after  infiltration with 0.5% Marcaine plain, and a 12 mm port was  placed. The laparoscope was inserted, and systematic exam of the  intraperitoneal contents showed no visceral injuries or other  abnormalities; however, the patient was noted to have a fairly  large patulous sigmoid colon. Next, under direct laparoscopic  vision, the following ports were placed: Robotic working ports  were placed 10 cm inferolateral from the supraumbilical location,  and a robotic port was placed 6 cm posterior from the left  robotic working port, and a 12 mm assistant port was placed 6 cm  posterior from the right robotic working port, and a 5 mm port  was placed midway between the supraumbilical port and the right  robotic working port. Once those ports were appropriately  positioned and established, the [facility] robot was brought into  the surgical field and docked to the appropriate ports. Ric  forceps were placed in the fourth arm, and monopolar eloy in  the right, and Gyrus forceps in the left.  The peritoneum lateral  to the right medial umbilical ligament was incised sharply,  exposing the space of Retzius. The right medial, then median,  and then finally left medial umbilical ligaments were thoroughly  cauterized and transected allowing the bladder to fall away from  the anterior abdominal wall. Next, the Ric forceps were used  to provide cephalad traction of the bladder. Multiple adhesions had to be taken down   prior to starting which took some time. The incision into the peritoneum was carried down to the level of the vas deference  bilateral. Next, the fibrofatty tissue overlying the prostate  gland was carefully resected and sent to Pathology for analysis. The endopelvic fascia was incised on either aspect of the  prostate gland, and the lateral dissection and apical dissection  of the prostate proceeded. The puboprostatic ligaments were  divided close to the prostate gland. The dorsal vein complex was  ligated with a running 0-Vicryl suture. The dorsal vein was cut sharply prior to this  and the pneumoperitoneum was temporarily turned up to 20 mm Hg. Next, the bladder neck was reta-transected  flush against the base of the prostate gland in a bladder  neck preservation technique. A large median lobe was noted and a figure of eight Vicryl  was placed into the median lobe to retract it upward out of the bladder to aid in the posterior dissection. Next, the  posterior bladder neck was transected with cautery to sharply  separate the posterior bladder neck from the prostate. The  ureteral orifices were easily identified, and were well away from  the surgical dissection plane. The dissection plane proceeded  down through the anterior leaflet of Denonvilliers fascia  exposing the underlying ampulla of the vas deference and seminal  vesicles. The ampulla of the vas deference was resected  approximately 1.5 cm in length and transected. The seminal  vesicles were resected down to their apices, and then the apical vessels   were ligated with gyrus bipolar device.    Next, the posterior leaflet of Denonvilliers fascia  was sharply incised  the rectum from the undersurface  of the prostate gland. The posterior prostate pedicles were then  divided with Hem-O-Sandie clips, and the neurovascular bundles were  then sharply  from the undersurface of the posterior  lateral prostate with sharp cold scissor dissection from the base  to the apex of the prostate. Upon completion of the non  nerve-sparing approach, the prostate was essentially free with  the exception of the urethra. The urethra was then  reta-transected flush against the apex of the prostate using  sharp cold scissor dissection. The urethrotomy was performed,  and the catheter withdrawn inside the urethra and the posterior  urethra was transected flush against the apex of the prostate  preserving as much urethral length as possible. Specimen was  then placed in an EndoCatch bag, positioned in the upper abdomen  for future retrieval. The wound was copiously and thoroughly  irrigated. A rectal test was then performed by filling the pelvis with irrigation and no rectal injury was seen. Next, the fibrofatty tissue overlying the right external iliac vein from circumflex iliac vein distally to the bifurcation of the common iliac vein proximally, the pelvic sidewall laterally, and the obturator nerve posteriorly was resected. Biopolar cauterization and/or small 5 mm clips were used to ligate lymphatic vessels. No injuries occurred to the adjacent structures and hemostasis was complete. The same dissection was carried out on the contralateral side. The lymph node specimens were sent to pathology for analysis. A V-lock suture was used to perform a single layer Son stitch, re-approximating Denonvillier's fascia to take tension off of the vesicourethral anastomosis.      Next, the vesicourethral anastomosis was performed using a 3-0 Monocryl V-LOCK double-arm suture technique in a continuous running tension-free and watertight anastomosis around a 18-Kiswahili silastic Heck catheter. At the end of the  anastomosis, the anastomotic suture was tied snugly and the  finishing catheter positioned well in the bladder. Ten cc was placed in the Heck catheter balloon using  sterile water. The bladder was filled to approximately 200 cc of  sterile saline, no leaking was noted. Some FloSeal was placed  along the neurovascular bundles, and hemostasis was nicely complete. The Da Arabella  surgical system was undocked and removed from the patient's  bedside, and under direct laparoscopic vision each of the port  sites was removed. The supraumbilical port was extended to  approximately 2 cm in length sharply with a #15 blade. The  fascia was also sharply extended, and the specimen extracted and  sent to Pathology for analysis. The fascia of the supraumbilical  location was closed in a running manner with 0 PDS suture,  and each of the port sites was irrigated. The skin of each port  closed with a 4-0 Monocryl subcuticular suture. Dermabond  was applied, and the patient was extubated in the operating room  and sent to recovery room in stable condition.     Queen Marilee JACK  , Dept of Urology  [employer]  [facility] [place], [place]  Pager #: [phone]

## 2018-08-31 NOTE — ANESTHESIA PREPROCEDURE EVALUATION
Anesthetic History No history of anesthetic complications Review of Systems / Medical History Patient summary reviewed and pertinent labs reviewed Pulmonary Neuro/Psych Within defined limits Cardiovascular Within defined limits Hypertension Exercise tolerance: >4 METS 
  
GI/Hepatic/Renal 
  
GERD Endo/Other Diabetes: type 2 Hypothyroidism: well controlled Other Findings Comments: Documentation of current medication Current medications obtained, documented and obtained? YES Risk Factors for Postoperative nausea/vomiting: 
     History of postoperative nausea/vomiting? NO Female? NO Motion sickness? NO Intended opioid administration for postoperative analgesia? YES Smoking Abstinence: 
Current Smoker? NO Elective Surgery? YES Seen preoperatively by anesthesiologist or proxy prior to day of surgery? YES Pt abstained from smoking 24 hours prior to anesthesia? N/A Preventive care/screening for High Blood Pressure: 
Aged 18 years and older: YES Screened for high blood pressure: YES Patients with high blood pressure referred to primary care provider 
 for BP management: YES Physical Exam 
 
Airway Mallampati: III 
TM Distance: 4 - 6 cm Neck ROM: normal range of motion Mouth opening: Normal 
 
 Cardiovascular Regular rate and rhythm,  S1 and S2 normal,  no murmur, click, rub, or gallop Rhythm: regular Rate: normal 
 
 
 
 Dental 
 
Dentition: Lower dentition intact and Upper dentition intact Comments: Loose L lower incisor Pulmonary Breath sounds clear to auscultation Abdominal 
GI exam deferred Other Findings Anesthetic Plan ASA: 3 Anesthesia type: general 
 
 
 
 
Induction: Intravenous Anesthetic plan and risks discussed with: Patient

## 2018-08-31 NOTE — IP AVS SNAPSHOT
[address] Patient: Ton Soler MRN: [id number]:[id number] About your hospitalization You were admitted on:  August 31, 2018 You last received care in the:  [address] [place] [address],[address] You were discharged on:  September 2, 2018 Why you were hospitalized Your primary diagnosis was:  Not on File Your diagnoses also included:  Prostate Ca (Hcc) Follow-up Information Follow up With Details Comments Contact Info MD NIRAJ Dudley/ Puneet Landa 81 Gabriela Barber [address] [address] [phone] Your Scheduled Appointments Friday September 07, 2018 10:00 AM EDT Nurse Visit with [facility] ([facility]) [address], [address] [facility] [address]  
[phone] Friday September 28, 2018  4:15 PM EDT  
ESTABLISHED PATIENT with Madina Valadez MD  
[facility] of [facility]) [address], [address] [facility] [address]  
[phone] Wednesday October 10, 2018  9:30 AM EDT PHYSICAL THERAPY with Claude Cousins, PT Urology of [facility] Keegan Gu [address] ([facility]) [address] [facility] [address]  
[phone] Discharge Orders Procedure Order Date Status Priority Quantity Spec Type Associated Dx CALL YOUR DOCTOR For: Other Call MD if fever >101.5, signs of infection, intractable pain, nausea or vomiting, worsening shortness of breath or chest pain, significant bleeding, lucia not draining, painful leg swelling, or any questions or concern. .. 08/31/18 1729 Normal Routine 1  Malignant neoplasm of prostate ([address] Verónica Karimi   Comments:  Call MD if fever >101.5, signs of infection, intractable pain, nausea or vomiting, worsening shortness of breath or chest pain, significant bleeding, lucia not draining, painful leg swelling, or any questions or concerns. Only let a urologist do anything with you lucia catheter while it is in place. Questions: For:  Other ACTIVITY AFTER DISCHARGE Patient should: Restrict lifting No heavy lifting >15 lbs for 4 weeks. No driving on narcotics. OK to shower 48 hours after surgery. No baths or submerging incisions for 4 weeks until incisions are completely healed. 08/31/18 1729 Normal Routine 1  Malignant neoplasm of prostate ([facility] [address] Fatoumata Barth Comments:  No heavy lifting >15 lbs for 4 weeks. No driving on narcotics. OK to shower 48 hours after surgery. No baths or submerging incisions for 4 weeks until incisions are completely healed. Questions: Patient should:  Restrict lifting DIET DIABETIC CONSISTENT CARB Regular 08/31/18 1729 Normal Routine 1  Malignant neoplasm of prostate ([facility] [address] Fatoumata Barth Questions: Texture:  Regular LUCIA CATHETER, CARE 08/31/18 1729 Normal Routine 1  Malignant neoplasm of prostate ([facility] [address] Fatoumata Barth A check hilda indicates which time of day the medication should be taken. My Medications START taking these medications Instructions Each Dose to Equal  
 Morning Noon Evening Bedtime  
 docusate sodium 100 mg capsule Commonly known as:  Raul Call Your last dose was: Your next dose is: Take 1 Cap by mouth two (2) times a day for 15 days. 100 mg  
    
   
   
   
  
 oxybutynin 5 mg tablet Commonly known as:  BKGTDJBB Your last dose was: Your next dose is: Take 1 Tab by mouth three (3) times daily as needed. Indications: Bladder Hyperactivity, URINARY URGENCY  
 5 mg  
    
   
   
   
  
 oxyCODONE-acetaminophen 5-325 mg per tablet Commonly known as:  PERCOCET Your last dose was: Your next dose is: Take 1 Tab by mouth every four (4) hours as needed for Pain.  Max Daily Amount: 6 Tabs. 1 Tab CONTINUE taking these medications Instructions Each Dose to Equal  
 Morning Noon Evening Bedtime  
 albuterol 90 mcg/actuation inhaler Commonly known as:  PROVENTIL HFA Your last dose was: Your next dose is: Take 1 Puff by inhalation every four (4) hours as needed for Wheezing. 1 Puff  
    
   
   
   
  
 aspirin delayed-release 81 mg tablet Your last dose was: Your next dose is: Take  by mouth daily. atorvastatin 40 mg tablet Commonly known as:  LIPITOR Your last dose was: Your next dose is:    
   
   
      
   
   
   
  
 dilTIAZem  mg XR capsule Commonly known as:  DILACOR XR Your last dose was: Your next dose is: Take  by mouth daily. FLOVENT  mcg/actuation inhaler Generic drug:  fluticasone Your last dose was: Your next dose is:    
   
   
      
   
   
   
  
 glimepiride 4 mg tablet Commonly known as:  AMARYL Your last dose was: Your next dose is:    
   
   
      
   
   
   
  
 GLUCOTROL 10 mg tablet Generic drug:  glipiZIDE Your last dose was: Your next dose is: Take 10 mg by mouth two (2) times a day. 10 mg  
    
   
   
   
  
 hydroCHLOROthiazide 25 mg tablet Commonly known as:  HYDRODIURIL Your last dose was: Your next dose is:    
   
   
      
   
   
   
  
 levothyroxine 50 mcg tablet Commonly known as:  SYNTHROID Your last dose was: Your next dose is:    
   
   
      
   
   
   
  
 pantoprazole 40 mg tablet Commonly known as:  PROTONIX Your last dose was: Your next dose is: PRINIVIL 20 mg tablet Generic drug:  lisinopril Your last dose was: Your next dose is: Take  by mouth daily. SYMBICORT 160-4.5 mcg/actuation aa Generic drug:  budesonide-formoterol Your last dose was: Your next dose is:    
   
   
      
   
   
   
  
 triamcinolone acetonide 0.1 % topical cream  
Commonly known as:  KENALOG Your last dose was: Your next dose is:    
   
   
      
   
   
   
  
  
  
Where to Get Your Medications Information on where to get these meds will be given to you by the nurse or doctor. ! Ask your nurse or doctor about these medications  
  docusate sodium 100 mg capsule  
 oxybutynin 5 mg tablet  
 oxyCODONE-acetaminophen 5-325 mg per tablet Opioid Education Prescription Opioids: What You Need to Know: 
 
 
 
F-face looks uneven A-arms unable to move or move unevenly S-speech slurred or non-existent T-time-call 911 as soon as signs and symptoms begin-DO NOT go Back to bed or wait to see if you get better-TIME IS BRAIN. Warning Signs of HEART ATTACK Call 911 if you have these symptoms: 
? Chest discomfort. Most heart attacks involve discomfort in the center of the chest that lasts more than a few minutes, or that goes away and comes back. It can feel like uncomfortable pressure, squeezing, fullness, or pain. ? Discomfort in other areas of the upper body. Symptoms can include pain or discomfort in one or both arms, the back, neck, jaw, or stomach. ? Shortness of breath with or without chest discomfort. ? Other signs may include breaking out in a cold sweat, nausea, or lightheadedness. Don't wait more than five minutes to call [address]! Fast action can save your life. Calling 911 is almost always the fastest way to get lifesaving treatment. Emergency Medical Services staff can begin treatment when they arrive  up to an hour sooner than if someone gets to the hospital by car. The discharge information has been reviewed with the patient. The patient verbalized understanding. Discharge medications reviewed with the patient and appropriate educational materials and side effects teaching were provided. ___________________________________________________________________________________________________________________________________ No heavy lifting or strenuous activity until cleared by Dr Vy Schaffer Please take care of your lucia catheter as instructed by your nurse. If you have nausea, vomiting, fevers, chills, problems with your catheter or worsening pain please contact Dr Michelle Contreras office. DISCHARGE SUMMARY from Nurse PATIENT INSTRUCTIONS: 
 
 
F-face looks uneven A-arms unable to move or move unevenly S-speech slurred or non-existent T-time-call 911 as soon as signs and symptoms begin-DO NOT go Back to bed or wait to see if you get better-TIME IS BRAIN. Warning Signs of HEART ATTACK Call 911 if you have these symptoms: 
? Chest discomfort. Most heart attacks involve discomfort in the center of the chest that lasts more than a few minutes, or that goes away and comes back. It can feel like uncomfortable pressure, squeezing, fullness, or pain. ? Discomfort in other areas of the upper body. Symptoms can include pain or discomfort in one or both arms, the back, neck, jaw, or stomach. ? Shortness of breath with or without chest discomfort. ? Other signs may include breaking out in a cold sweat, nausea, or lightheadedness. Don't wait more than five minutes to call [address]! Fast action can save your life. Calling 911 is almost always the fastest way to get lifesaving treatment. Emergency Medical Services staff can begin treatment when they arrive  up to an hour sooner than if someone gets to the hospital by car. The discharge information has been reviewed with the patient. The patient verbalized understanding. Discharge medications reviewed with the patient and appropriate educational materials and side effects teaching were provided. ___________________________________________________________________________________________________________________________________ Patient armband removed and shredded Learning About a Radical Prostatectomy What is a radical prostatectomy? A radical prostatectomy is surgery to remove the prostate gland and some of the tissue near it. It is done to treat prostate cancer that has not spread out of the prostate. The prostate gland is a small, walnut-shaped organ. It lies just below a man's bladder. It surrounds the urethra. That's the tube that carries urine from the bladder out of the body through the penis. The prostate gland makes most of the fluid in semen. When you find out that you have cancer, you may feel many emotions and need help coping. It may help to talk with your family, friends, or a therapist about your feelings. You also can do things at home to make yourself feel better while you go through treatment. Call the [address] (3-529.384.4751) or visit its website at [address] [employer] to learn more. How is this surgery done? There are three main types of this surgery. You and your doctor can choose which type is right for you.  
During surgery, the nerves that run along the sides of the prostate may be damaged or removed. These nerves affect whether a man can have an erection. In some cases, the doctor may be able to avoid damage to these nerves. This is called nerve-sparing surgery. It makes it more likely that a man can still have an erection after surgery. Radical retropubic prostatectomy The doctor makes a 3- to 4-inch cut (incision) in the skin between the belly button and the pubic bone. This is done to remove the prostate. Nerve-sparing may be possible with this approach. Radical perineal prostatectomy The doctor makes a cut between the scrotum and anus. The prostate and other tissue are then removed. This surgery does not take as long as a retropubic surgery. And it may cause less pain. But nerve-sparing is hard to do with this approach. So this surgery is not done as often as a retropubic prostatectomy. Laparoscopic radical prostatectomy The doctor puts a lighted tube, or scope, and other tools through a few small cuts in the lower belly. Nerve-sparing may be possible with this approach. This surgery may also be done with the help of a robot (robotic-assisted). Robotic arms translate the surgeon's hand motions into finer and more precise action. What can you expect after a radical prostatectomy? You will likely stay in the hospital for 1 to 3 days after surgery. Most men can go back to work or their usual routine in about 3 to 5 weeks. But it can take longer to fully recover. After your surgery, you will need to have routine checks with your doctor. This includes having blood tests to measure your PSA level. PSA is a substance that can help show if your cancer has come back. PSA tests are usually done every 3 to 6 months for the first 3 years. After that they are done less often. You may not need any more cancer treatment after surgery. But in some cases, you may need radiation or hormone therapy. Erection problems and fertility Whether you can have an erection after surgery will depend on: · Your age. · Whether you could have erections before the surgery. · Whether the nerves near your prostate were damaged or removed. If the doctor removes the nerves, you will need to use medicine or other treatment to have erections from now on. If your nerves are still in place, you may not need long-term medicines or treatments. As a rule, the younger you are, the better your chances that you will still be able to get an erection. Even if your nerves are not damaged, it will probably take 3 to 12 months after surgery before you will be able to have an erection on your own. During this time, you can use medicine or other treatments to get an erection. If you have this surgery, you won't be able to ejaculate sperm. That's because the surgery cuts the connection between the testicles and the penis. If you want to be able to father children, talk to your doctor about your options. You may be able to save your sperm before the surgery. Urinary incontinence After this surgery, some men may not be able to control when they pass urine. Or they may leak urine. This is called urinary incontinence. It may go away within weeks or months after surgery. Or it may never go away. Some men will have leakage only once in a while. But other men may find it hard to control their urine all the time. Doctors can't say who will have problems and who will not. And they can't tell how long after surgery the problem may last. Medicine and exercises to strengthen the muscles that control the bladder can often help. Follow-up care is a key part of your treatment and safety. Be sure to make and go to all appointments, and call your doctor if you are having problems. It's also a good idea to know your test results and keep a list of the medicines you take. Where can you learn more? Go to http://lula-antonia.info/. Enter I333 in the search box to learn more about \"Learning About a Radical Prostatectomy. \" 
 Current as of: May 12, 2017 Content Version: 11.7 © 1392-0387 [employer]. Care instructions adapted under license by [employer] (which disclaims liability or warranty for this information). If you have questions about a medical condition or this instruction, always ask your healthcare professional. [address] any warranty or liability for your use of this information. Oxybutynin (Ditropan XL) - (By mouth) Why this medicine is used:  
Treats overactive bladder. Contact a nurse or doctor right away if you have: · Agitation, confusion, unusual behavior or drowsiness · Seeing, hearing, or feeling things that are not there Common side effects: 
· Constipation, diarrhea · Nausea, heartburn, stomach pain · Dry mouth © 2017 [facility] Information is for End User's use only and may not be sold, redistributed or otherwise used for commercial purposes. [employer] Announcement We are excited to announce that we are making your provider's discharge notes available to you in [employer]. You will see these notes when they are completed and signed by the physician that discharged you from your recent hospital stay. If you have any questions or concerns about any information you see in [employer], please call the Health Information Department where you were seen or reach out to your Primary Care Provider for more information about your plan of care. Introducing [facility]! Willy Lerma introduces [employer] patient portal. Now you can access parts of your medical record, email your doctor's office, and request medication refills online. 1. In your internet browser, go to [email or web address] 2. Click on the First Time User? Click Here link in the Sign In box. You will see the New Member Sign Up page. 3. Enter your [employer] Access Code exactly as it appears below.  You will not need to use this code after youve completed the sign-up process. If you do not sign up before the expiration date, you must request a new code. · [employer] Access Code: [email or web address] Expires: 11/14/2018  7:48 AM 
 
4. Enter the last four digits of your Social Security Number (xxxx) and Date of Birth (mm/dd/yyyy) as indicated and click Submit. You will be taken to the next sign-up page. 5. Create a [employer] ID. This will be your [employer] login ID and cannot be changed, so think of one that is secure and easy to remember. 6. Create a [employer] password. You can change your password at any time. 7. Enter your Password Reset Question and Answer. This can be used at a later time if you forget your password. 8. Enter your e-mail address. You will receive e-mail notification when new information is available in [address] 9. Click Sign Up. You can now view and download portions of your medical record. 10. Click the Download Summary menu link to download a portable copy of your medical information. If you have questions, please visit the Frequently Asked Questions section of the [employer] website. Remember, [employer] is NOT to be used for urgent needs. For medical emergencies, dial 911. Now available from your iPhone and Android! Introducing Murray Patel As a [facility] patient, I wanted to make you aware of our electronic visit tool called Murray Jorge. [facility] 24/7 allows you to connect within minutes with a medical provider 24 hours a day, seven days a week via a mobile device or tablet or logging into a secure website from your computer. You can access Murray Patel from anywhere in the [place].  
 
A virtual visit might be right for you when you have a simple condition and feel like you just dont want to get out of bed, or cant get away from work for an appointment, when your regular [facility] provider is not available (evenings, weekends or holidays), or when youre out of town and need minor care. Electronic visits cost only $49 and if the [facility] 24/7 provider determines a prescription is needed to treat your condition, one can be electronically transmitted to a nearby pharmacy*. Please take a moment to enroll today if you have not already done so. The enrollment process is free and takes just a few minutes. To enroll, please download the [facility] 24/3CI maria elena to your tablet or phone, or visit [email or web address] to enroll on your computer. And, as an [address] [place] [address] patient with a [employer] account, the results of your visits will be scanned into your electronic medical record and your primary care provider will be able to view the scanned results. We urge you to continue to see your regular [facility] provider for your ongoing medical care. And while your primary care provider may not be the one available when you seek a [employer] virtual visit, the peace of mind you get from getting a real diagnosis real time can be priceless. For more information on [employer], view our Frequently Asked Questions (FAQs) at [email or web address]. Sincerely, 
 
Sherrie Guzman MD 
Chief Medical Officer [employer] *:  certain medications cannot be prescribed via [employer] Providers Seen During Your Hospitalization Provider Specialty Primary office phone Jose Barrientos MD Urology [phone] Your Primary Care Physician (PCP) Primary Care Physician Office Phone Office Fax Jeni Alfredo [phone] You are allergic to the following No active allergies Recent Documentation Height Weight BMI Smoking Status 1.803 m 77.3 kg 23.75 kg/m2 Former Smoker Emergency Contacts Name Discharge Info Relation Home Work Mobile Luke Cabello DISCHARGE CAREGIVER [3] Other Relative [6] [phone] [facility] DISCHARGE CAREGIVER [3] Brother [24] [phone] Patient Belongings The following personal items are in your possession at time of discharge: 
  Dental Appliances: None  Visual Aid: Glasses      Home Medications: None   Jewelry: None  Clothing: Pants, Footwear, Undergarments, Socks, Shirt    Other Valuables: Lit Nolan Please provide this summary of care documentation to your next provider. Signatures-by signing, you are acknowledging that this After Visit Summary has been reviewed with you and you have received a copy. Patient Signature:  ____________________________________________________________ Date:  ____________________________________________________________  
  
[facility] Signature:  ____________________________________________________________ Date:  ____________________________________________________________

## 2018-09-01 LAB
ANION GAP SERPL CALC-SCNC: 6 MMOL/L (ref 3–18)
BUN SERPL-MCNC: 30 MG/DL (ref 7–18)
BUN/CREAT SERPL: 16 (ref 12–20)
CALCIUM SERPL-MCNC: 8 MG/DL (ref 8.5–10.1)
CHLORIDE SERPL-SCNC: 107 MMOL/L (ref 100–108)
CO2 SERPL-SCNC: 24 MMOL/L (ref 21–32)
CREAT SERPL-MCNC: 1.83 MG/DL (ref 0.6–1.3)
ERYTHROCYTE [DISTWIDTH] IN BLOOD BY AUTOMATED COUNT: 14.7 % (ref 11.6–14.5)
GLUCOSE BLD STRIP.AUTO-MCNC: 109 MG/DL (ref 70–110)
GLUCOSE BLD STRIP.AUTO-MCNC: 121 MG/DL (ref 70–110)
GLUCOSE BLD STRIP.AUTO-MCNC: 140 MG/DL (ref 70–110)
GLUCOSE BLD STRIP.AUTO-MCNC: 177 MG/DL (ref 70–110)
GLUCOSE SERPL-MCNC: 105 MG/DL (ref 74–99)
HCT VFR BLD AUTO: 31 % (ref 36–48)
HGB BLD-MCNC: 10.1 G/DL (ref 13–16)
MCH RBC QN AUTO: 29.4 PG (ref 24–34)
MCHC RBC AUTO-ENTMCNC: 32.6 G/DL (ref 31–37)
MCV RBC AUTO: 90.1 FL (ref 74–97)
PLATELET # BLD AUTO: 201 K/UL (ref 135–420)
PMV BLD AUTO: 11.7 FL (ref 9.2–11.8)
POTASSIUM SERPL-SCNC: 5.5 MMOL/L (ref 3.5–5.5)
RBC # BLD AUTO: 3.44 M/UL (ref 4.7–5.5)
SODIUM SERPL-SCNC: 137 MMOL/L (ref 136–145)
WBC # BLD AUTO: 9.9 K/UL (ref 4.6–13.2)

## 2018-09-01 PROCEDURE — 99218 HC RM OBSERVATION: CPT

## 2018-09-01 PROCEDURE — 85027 COMPLETE CBC AUTOMATED: CPT | Performed by: STUDENT IN AN ORGANIZED HEALTH CARE EDUCATION/TRAINING PROGRAM

## 2018-09-01 PROCEDURE — 82962 GLUCOSE BLOOD TEST: CPT

## 2018-09-01 PROCEDURE — 94640 AIRWAY INHALATION TREATMENT: CPT

## 2018-09-01 PROCEDURE — 74011250636 HC RX REV CODE- 250/636: Performed by: STUDENT IN AN ORGANIZED HEALTH CARE EDUCATION/TRAINING PROGRAM

## 2018-09-01 PROCEDURE — 36415 COLL VENOUS BLD VENIPUNCTURE: CPT | Performed by: STUDENT IN AN ORGANIZED HEALTH CARE EDUCATION/TRAINING PROGRAM

## 2018-09-01 PROCEDURE — 80048 BASIC METABOLIC PNL TOTAL CA: CPT | Performed by: STUDENT IN AN ORGANIZED HEALTH CARE EDUCATION/TRAINING PROGRAM

## 2018-09-01 PROCEDURE — 74011000250 HC RX REV CODE- 250: Performed by: UROLOGY

## 2018-09-01 PROCEDURE — 74011250637 HC RX REV CODE- 250/637: Performed by: STUDENT IN AN ORGANIZED HEALTH CARE EDUCATION/TRAINING PROGRAM

## 2018-09-01 PROCEDURE — 74011636637 HC RX REV CODE- 636/637: Performed by: STUDENT IN AN ORGANIZED HEALTH CARE EDUCATION/TRAINING PROGRAM

## 2018-09-01 RX ADMIN — OXYCODONE HYDROCHLORIDE AND ACETAMINOPHEN 2 TABLET: 5; 325 TABLET ORAL at 13:07

## 2018-09-01 RX ADMIN — OXYCODONE HYDROCHLORIDE AND ACETAMINOPHEN 2 TABLET: 5; 325 TABLET ORAL at 05:42

## 2018-09-01 RX ADMIN — HEPARIN SODIUM 5000 UNITS: 5000 INJECTION INTRAVENOUS; SUBCUTANEOUS at 11:56

## 2018-09-01 RX ADMIN — SODIUM CHLORIDE, SODIUM LACTATE, POTASSIUM CHLORIDE, AND CALCIUM CHLORIDE 75 ML/HR: 600; 310; 30; 20 INJECTION, SOLUTION INTRAVENOUS at 22:24

## 2018-09-01 RX ADMIN — BUDESONIDE 500 MCG: 0.5 INHALANT RESPIRATORY (INHALATION) at 20:48

## 2018-09-01 RX ADMIN — HYDROCHLOROTHIAZIDE 25 MG: 25 TABLET ORAL at 08:40

## 2018-09-01 RX ADMIN — OXYCODONE HYDROCHLORIDE AND ACETAMINOPHEN 1 TABLET: 5; 325 TABLET ORAL at 00:41

## 2018-09-01 RX ADMIN — OXYBUTYNIN CHLORIDE 5 MG: 5 TABLET ORAL at 17:06

## 2018-09-01 RX ADMIN — CEFAZOLIN SODIUM 2 G: 2 SOLUTION INTRAVENOUS at 05:38

## 2018-09-01 RX ADMIN — SODIUM CHLORIDE, SODIUM LACTATE, POTASSIUM CHLORIDE, AND CALCIUM CHLORIDE 75 ML/HR: 600; 310; 30; 20 INJECTION, SOLUTION INTRAVENOUS at 05:38

## 2018-09-01 RX ADMIN — OXYCODONE HYDROCHLORIDE AND ACETAMINOPHEN 2 TABLET: 5; 325 TABLET ORAL at 22:24

## 2018-09-01 RX ADMIN — Medication 10 ML: at 06:00

## 2018-09-01 RX ADMIN — ARFORMOTEROL TARTRATE 15 MCG: 15 SOLUTION RESPIRATORY (INHALATION) at 20:48

## 2018-09-01 RX ADMIN — LEVOTHYROXINE SODIUM 50 MCG: 25 TABLET ORAL at 05:37

## 2018-09-01 RX ADMIN — HEPARIN SODIUM 5000 UNITS: 5000 INJECTION INTRAVENOUS; SUBCUTANEOUS at 03:41

## 2018-09-01 RX ADMIN — HEPARIN SODIUM 5000 UNITS: 5000 INJECTION INTRAVENOUS; SUBCUTANEOUS at 19:11

## 2018-09-01 RX ADMIN — ARFORMOTEROL TARTRATE 15 MCG: 15 SOLUTION RESPIRATORY (INHALATION) at 08:25

## 2018-09-01 RX ADMIN — OXYBUTYNIN CHLORIDE 5 MG: 5 TABLET ORAL at 08:40

## 2018-09-01 RX ADMIN — CEFAZOLIN SODIUM 2 G: 2 SOLUTION INTRAVENOUS at 13:07

## 2018-09-01 RX ADMIN — LISINOPRIL 20 MG: 20 TABLET ORAL at 08:40

## 2018-09-01 RX ADMIN — INSULIN LISPRO 2 UNITS: 100 INJECTION, SOLUTION INTRAVENOUS; SUBCUTANEOUS at 17:01

## 2018-09-01 RX ADMIN — OXYBUTYNIN CHLORIDE 5 MG: 5 TABLET ORAL at 22:24

## 2018-09-01 RX ADMIN — BUDESONIDE 500 MCG: 0.5 INHALANT RESPIRATORY (INHALATION) at 08:25

## 2018-09-01 RX ADMIN — PANTOPRAZOLE SODIUM 40 MG: 40 TABLET, DELAYED RELEASE ORAL at 08:40

## 2018-09-01 NOTE — PROGRESS NOTES
conducted an initial consultation and Spiritual Assessment for Vanda Weston, who is a 68 y. o.,male. Patients Primary Language is: [place]. According to the patients EMR Yazidism Affiliation is: [facility].     The reason the Patient came to the hospital is:   Patient Active Problem List    Diagnosis Date Noted    Prostate CA [facility]) 08/31/2018    Malignant neoplasm of prostate ([facility] [address] 07/25/2018    [facility] 07/09/2014        The  provided the following Interventions:  Initiated a relationship of care and support. Explored issues of patt, belief, spirituality and Adventism/ritual needs while hospitalized. Listened empathically. Provided information about Spiritual Care Services. Offered prayer and assurance of continued prayers on patient's behalf. Chart reviewed. The following outcomes were achieved:  Patient shared limited information about both their medical narrative and spiritual journey/beliefs. Patient processed feeling about current hospitalization. Patient expressed gratitude for 's visit. Assessment:  Patient does not have any Adventism/cultural needs that will affect patients preferences in health care. There are no further spiritual or Adventism issues which require intervention at this time. Plan:  Chaplains will continue to follow and will provide pastoral care on an as needed/requested basis.  recommends bedside caregivers page  on duty if patient shows signs of acute spiritual or emotional distress. Dariel Beverly M.Div.   [facility] [address]  [phone]

## 2018-09-01 NOTE — ROUTINE PROCESS
Patient and/or next of kin has been given and has signed the [facility] Outpatient Observation  Notification letter and all questions answered. Copy of this notice given to patient and copy placed on chart. Patient and/or next of kin has been given the Outpatient Observation Information and Notification letter and all questions answered. Letters signed.  Copy to pt and to chart      Sha PANN  Outcomes Manager  Pager # [phone]

## 2018-09-01 NOTE — PROGRESS NOTES
Problem: Falls - Risk of  Goal: *Absence of Falls  Document Alphonse Fall Risk and appropriate interventions in the flowsheet.    Outcome: Progressing Towards Goal  Fall Risk Interventions:  Mobility Interventions: Patient to call before getting OOB, Utilize walker, cane, or other assistive device, Communicate number of staff needed for ambulation/transfer         Medication Interventions: Patient to call before getting OOB, Teach patient to arise slowly, Evaluate medications/consider consulting pharmacy    Elimination Interventions: Call light in reach, Toileting schedule/hourly rounds, Patient to call for help with toileting needs    History of Falls Interventions: Door open when patient unattended

## 2018-09-01 NOTE — PROGRESS NOTES
Physical Exam   Skin:             0730 - Bedside and Verbal shift change report given to Allen Woody, RN (oncoming nurse) by Garett Cummings, SABA (offgoing nurse). Report included the following information SBAR, Kardex, Intake/Output and MAR.     0642 - paged MD on call to determine if cardizem should be adminstered d/t HR of 58.     0908 - Per Dr. Liz Pardo, hold cardizem. 1156 - heparin administered. Ambulated pt to recliner at bedside. Call bell, ICS & bedside table all within reach. Will cont to monitor. 1307 - pt req prn meds be administered for pain rated 10/10 prior to ambualtion. Pt now ambulating in hallway w/brother. Pt family demanding ginger ale for themselves, notified family that refreshments are for patients only. No gait disturbance noted, will cont to monitor. 18 - family back at nurses station stating their brother has not received a meal tray. Spoke to nutrition services & tray will be brought up. Pt had refused lunch. 1407 - pain reassessment performed. Pt rates pain 5/10. Family at bedside, NAD noted, will cont to monitor. 26 - pt family demanding pt linen be changed. This RN changed linen. Pt states he does not want to get back into bed at this time. 1911 - Bedside and Verbal shift change report given to Bonny Luciano, RN (oncoming nurse) by Allen Woody, RN (offgoing nurse). Report included the following information SBAR, Kardex, Intake/Output and MAR.

## 2018-09-01 NOTE — MANAGEMENT PLAN
Care Management Interventions  PCP Verified by CM: Yes (Dr Heidi Blanco)  Last Visit to PCP: 08/27/18  Mode of Transport at Discharge:  Other (see comment) (sister)  Transition of Care Consult (CM Consult): Discharge Planning  Current Support Network: Own Home (will be staying with sister post hospital)  Confirm Follow Up Transport: Self  Plan discussed with Pt/Family/Caregiver: Yes  Discharge Location  Discharge Placement: Home

## 2018-09-01 NOTE — ANESTHESIA POSTPROCEDURE EVALUATION
Post-Anesthesia Evaluation and Assessment Patient: Mathieu Edouard MRN: [id number]  SSN: [id number] YOB: 1945  Age: 68 y.o. Sex: male Cardiovascular Function/Vital Signs Visit Vitals  /74  Pulse (!) 56  Temp 36.7 °C (98 °F)  Resp 14  
 Ht 5' 11\" (1.803 m)  Wt 77.3 kg (170 lb 5 oz)  SpO2 96%  BMI 23.75 kg/m2 Patient is status post general anesthesia for Procedure(s): 
ROBOTIC RADICAL PROSTATECTOMY, BILATERAL PELVIC LYMPH NODE DISSECTION . Nausea/Vomiting: None Postoperative hydration reviewed and adequate. Pain: 
Pain Scale 1: Numeric (0 - 10) (08/31/18 1942) Pain Intensity 1: 0 (08/31/18 1942) Managed Neurological Status:  
Neuro (WDL): Within Defined Limits (08/31/18 1838) Neuro Neurologic State: Drowsy; Eyes open spontaneously (08/31/18 1838) Orientation Level: Oriented to person;Oriented to place;Oriented to situation (08/31/18 1838) Cognition: Follows commands (08/31/18 1838) LUE Motor Response: Purposeful (08/31/18 1746) LLE Motor Response: Purposeful (08/31/18 1746) RUE Motor Response: Purposeful (08/31/18 1746) RLE Motor Response: Purposeful (08/31/18 1746) At baseline Mental Status and Level of Consciousness: Alert and oriented Pulmonary Status:  
O2 Device: Room air (08/31/18 2019) Adequate oxygenation and airway patent Complications related to anesthesia: None Post-anesthesia assessment completed. No concerns Signed By: Sebastián Alvarez MD   
 August 31, 2018

## 2018-09-01 NOTE — PROGRESS NOTES
Assumed care of pt from [facility]. Alert and oriented x 4. Report included SBAR, MAR, kardex. Frequent use items within reach. Bed locked in lowest position. Call bell within reach. Pt verbalizes understanding to call for assistance. 2230 medicated for pain with relief. 0000 resting comfortably. 0300 Patient up to bathroom to wash,clean gown given and bed changed. 0600 resting comfortably. 0830 Bedside shift change report given to Curly Luciano RN (oncoming nurse) by Scott Skaggs RN (offgoing nurse). Report included the following information SBAR. ,mar,kardex.

## 2018-09-01 NOTE — PROGRESS NOTES
Urology Progress Note        Assessment/Plan:     Patient Active Problem List   Diagnosis Code    COPD with acute exacerbation ([facility] [address] J44.1    Malignant neoplasm of prostate ([facility] [facility] C61    Prostate CA [facility]) Emily Saeed     Mr. Nyla Holland is a 68 y.o. male with prostate cancer S/p Robot assisted laparoscopic radical prostatectomy . He is having moderate pain and increased creatinine levels from baseline (1.6 to 1.8). Plan:    · Doing well and continue with treatment  · Advance diet  · Increase ambulation  · Increased creatinine levels, will repeat labs in AM.   · Encouraged ambulation, hydration and ICS. · We will see him again , Possible D/C  if labs Via Brisa Mcgowan MD    [phone]      Subjective:     Daily Progress Note: 2018 3:46 PM    Nyla Holland is doing satisfactory. He reports pain is well controlled. He has complaints of  pain. He is tolerating a solid diet and ambulating with assistance. Indwelling catheter is draining well. Objective:     Visit Vitals    /63 (BP 1 Location: Left arm, BP Patient Position: At rest)    Pulse 62    Temp 97.5 °F (36.4 °C)    Resp 16    Ht 5' 11\" (1.803 m)    Wt 170 lb 5 oz (77.3 kg)    SpO2 99%    BMI 23.75 kg/m2        Temp (24hrs), Av.1 °F (36.7 °C), Min:97.5 °F (36.4 °C), Max:98.4 °F (36.9 °C)      Intake and Output:  1901 -  0700  In: 1700 [I.V.:1700]  Out: 1200 [Urine:1000]   07 -  1900  In: -   Out: 1500 [Urine:1500]    PHYSICAL EXAMINATION:   Visit Vitals    /63 (BP 1 Location: Left arm, BP Patient Position: At rest)    Pulse 62    Temp 97.5 °F (36.4 °C)    Resp 16    Ht 5' 11\" (1.803 m)    Wt 170 lb 5 oz (77.3 kg)    SpO2 99%    BMI 23.75 kg/m2     Constitutional: Well developed, well nourished. No acute distress.     HEENT: Normocephalic, Atraumatic, EOM's intact   CV:  RRR  Respiratory: No respiratory distress or difficulties breathing   Abdomen:  Soft, non-tender, non-distended   Male:   CVA tenderness: no   Mild to moderate abdominal distension         SCROTUM:  No swelling         PENIS: no swelling  Heck: clear urine  Skin: No evidence of jaundice. Normal color  Neuro/Psych:  Alert and oriented. Affect appropriate. Labs:     Labs: Results:   Chemistry    Recent Labs      09/01/18 0424   GLU  105*   NA  137   K  5.5   CL  107   CO2  24   BUN  30*   CREA  1.83*   CA  8.0*   AGAP  6   BUCR  16      CBC w/Diff Recent Labs      09/01/18 0424   WBC  9.9   RBC  3.44*   HGB  10.1*   HCT  31.0*   PLT  201      Cultures No results for input(s): CULT in the last 72 hours.   All Micro Results     None            Urinalysis Color   Date Value Ref Range Status   06/12/2014 YELLOW   Final     Appearance   Date Value Ref Range Status   06/12/2014 CLEAR   Final     Specific gravity   Date Value Ref Range Status   06/12/2014 1.010 1.003 - 1.030   Final     pH (UA)   Date Value Ref Range Status   06/12/2014 5.5 5.0 - 8.0   Final     Protein   Date Value Ref Range Status   06/12/2014 NEGATIVE  NEG mg/dL Final     Ketone   Date Value Ref Range Status   06/12/2014 NEGATIVE  NEG mg/dL Final     Bilirubin   Date Value Ref Range Status   06/12/2014 NEGATIVE  NEG   Final     Blood   Date Value Ref Range Status   06/12/2014 NEGATIVE  NEG   Final     Urobilinogen   Date Value Ref Range Status   06/12/2014 0.2 0.2 - 1.0 EU/dL Final     Nitrites   Date Value Ref Range Status   06/12/2014 NEGATIVE  NEG   Final     Leukocyte Esterase   Date Value Ref Range Status   06/12/2014 NEGATIVE  NEG   Final     Potassium   Date Value Ref Range Status   09/01/2018 5.5 3.5 - 5.5 mmol/L Final     Creatinine   Date Value Ref Range Status   09/01/2018 1.83 (H) 0.6 - 1.3 MG/DL Final     BUN   Date Value Ref Range Status   09/01/2018 30 (H) 7.0 - 18 MG/DL Final     Prostate Specific Ag   Date Value Ref Range Status   04/13/2010 1.0 0.0 - 4.0 ng/mL Final      PSA No results for input(s): PSA in the last 72 hours.    Coagulation Lab Results   Component Value Date/Time    Prothrombin time 12.8 01/12/2014 03:40 PM    INR 1.0 01/12/2014 03:40 PM    aPTT 40.6 (H) 01/12/2014 03:40 PM           Levi Gilbert MD  THE [facility]  A Division of [facility] [place]

## 2018-09-01 NOTE — PROGRESS NOTES
1959:  Patient received in bed; drowsy. Patient family at bedside. Call bell within reach. Will continue to monitor. Bedside shift change report given to Tatum Vasquez RN (oncoming nurse) by Karin Marti RN (offgoing nurse). Report included the following information SBAR, OR Summary, MAR, Recent Results and Med Rec Status.

## 2018-09-02 VITALS
WEIGHT: 170.31 LBS | HEIGHT: 71 IN | TEMPERATURE: 98.4 F | SYSTOLIC BLOOD PRESSURE: 167 MMHG | BODY MASS INDEX: 23.84 KG/M2 | HEART RATE: 60 BPM | RESPIRATION RATE: 16 BRPM | DIASTOLIC BLOOD PRESSURE: 75 MMHG | OXYGEN SATURATION: 99 %

## 2018-09-02 LAB
ANION GAP SERPL CALC-SCNC: 9 MMOL/L (ref 3–18)
BUN SERPL-MCNC: 28 MG/DL (ref 7–18)
BUN/CREAT SERPL: 15 (ref 12–20)
CALCIUM SERPL-MCNC: 8.6 MG/DL (ref 8.5–10.1)
CHLORIDE SERPL-SCNC: 106 MMOL/L (ref 100–108)
CO2 SERPL-SCNC: 23 MMOL/L (ref 21–32)
CREAT SERPL-MCNC: 1.81 MG/DL (ref 0.6–1.3)
ERYTHROCYTE [DISTWIDTH] IN BLOOD BY AUTOMATED COUNT: 14.8 % (ref 11.6–14.5)
GLUCOSE BLD STRIP.AUTO-MCNC: 164 MG/DL (ref 70–110)
GLUCOSE BLD STRIP.AUTO-MCNC: 191 MG/DL (ref 70–110)
GLUCOSE SERPL-MCNC: 146 MG/DL (ref 74–99)
HCT VFR BLD AUTO: 33.1 % (ref 36–48)
HGB BLD-MCNC: 10.8 G/DL (ref 13–16)
MCH RBC QN AUTO: 29.2 PG (ref 24–34)
MCHC RBC AUTO-ENTMCNC: 32.6 G/DL (ref 31–37)
MCV RBC AUTO: 89.5 FL (ref 74–97)
PLATELET # BLD AUTO: 206 K/UL (ref 135–420)
PMV BLD AUTO: 11.5 FL (ref 9.2–11.8)
POTASSIUM SERPL-SCNC: 4.9 MMOL/L (ref 3.5–5.5)
RBC # BLD AUTO: 3.7 M/UL (ref 4.7–5.5)
SODIUM SERPL-SCNC: 138 MMOL/L (ref 136–145)
WBC # BLD AUTO: 7.7 K/UL (ref 4.6–13.2)

## 2018-09-02 PROCEDURE — 74011250637 HC RX REV CODE- 250/637: Performed by: STUDENT IN AN ORGANIZED HEALTH CARE EDUCATION/TRAINING PROGRAM

## 2018-09-02 PROCEDURE — 94760 N-INVAS EAR/PLS OXIMETRY 1: CPT

## 2018-09-02 PROCEDURE — 94640 AIRWAY INHALATION TREATMENT: CPT

## 2018-09-02 PROCEDURE — 74011250636 HC RX REV CODE- 250/636: Performed by: STUDENT IN AN ORGANIZED HEALTH CARE EDUCATION/TRAINING PROGRAM

## 2018-09-02 PROCEDURE — 77030010545

## 2018-09-02 PROCEDURE — 85027 COMPLETE CBC AUTOMATED: CPT | Performed by: UROLOGY

## 2018-09-02 PROCEDURE — 99218 HC RM OBSERVATION: CPT

## 2018-09-02 PROCEDURE — 77030034849

## 2018-09-02 PROCEDURE — 74011636637 HC RX REV CODE- 636/637: Performed by: STUDENT IN AN ORGANIZED HEALTH CARE EDUCATION/TRAINING PROGRAM

## 2018-09-02 PROCEDURE — 36415 COLL VENOUS BLD VENIPUNCTURE: CPT | Performed by: UROLOGY

## 2018-09-02 PROCEDURE — 74011000250 HC RX REV CODE- 250: Performed by: UROLOGY

## 2018-09-02 PROCEDURE — 82962 GLUCOSE BLOOD TEST: CPT

## 2018-09-02 PROCEDURE — 80048 BASIC METABOLIC PNL TOTAL CA: CPT | Performed by: UROLOGY

## 2018-09-02 RX ADMIN — HYDROCHLOROTHIAZIDE 25 MG: 25 TABLET ORAL at 10:16

## 2018-09-02 RX ADMIN — ARFORMOTEROL TARTRATE 15 MCG: 15 SOLUTION RESPIRATORY (INHALATION) at 07:54

## 2018-09-02 RX ADMIN — LEVOTHYROXINE SODIUM 50 MCG: 25 TABLET ORAL at 06:06

## 2018-09-02 RX ADMIN — PANTOPRAZOLE SODIUM 40 MG: 40 TABLET, DELAYED RELEASE ORAL at 08:42

## 2018-09-02 RX ADMIN — HEPARIN SODIUM 5000 UNITS: 5000 INJECTION INTRAVENOUS; SUBCUTANEOUS at 03:17

## 2018-09-02 RX ADMIN — INSULIN LISPRO 2 UNITS: 100 INJECTION, SOLUTION INTRAVENOUS; SUBCUTANEOUS at 08:28

## 2018-09-02 RX ADMIN — OXYBUTYNIN CHLORIDE 5 MG: 5 TABLET ORAL at 10:16

## 2018-09-02 RX ADMIN — BUDESONIDE 500 MCG: 0.5 INHALANT RESPIRATORY (INHALATION) at 07:54

## 2018-09-02 RX ADMIN — DILTIAZEM HYDROCHLORIDE 240 MG: 120 CAPSULE, COATED, EXTENDED RELEASE ORAL at 10:17

## 2018-09-02 RX ADMIN — Medication 10 ML: at 08:29

## 2018-09-02 RX ADMIN — Medication 10 ML: at 03:17

## 2018-09-02 RX ADMIN — LISINOPRIL 20 MG: 20 TABLET ORAL at 10:17

## 2018-09-02 NOTE — DISCHARGE INSTRUCTIONS
DISCHARGE SUMMARY from Nurse    PATIENT INSTRUCTIONS:    After general anesthesia or intravenous sedation, for 24 hours or while taking prescription Narcotics:  · Limit your activities  · Do not drive and operate hazardous machinery  · Do not make important personal or business decisions  · Do  not drink alcoholic beverages  · If you have not urinated within 8 hours after discharge, please contact your surgeon on call. Report the following to your surgeon:  · Excessive pain, swelling, redness or odor of or around the surgical area  · Temperature over 100.5  · Nausea and vomiting lasting longer than 4 hours or if unable to take medications  · Any signs of decreased circulation or nerve impairment to extremity: change in color, persistent  numbness, tingling, coldness or increase pain  · Any questions    What to do at Home:  Recommended activity: Activity as tolerated and no driving for today and Ambulate in house,     If you experience any of the following symptoms like increasing pain  , please follow up with Dr Sarai Martin  . *  Please give a list of your current medications to your Primary Care Provider. *  Please update this list whenever your medications are discontinued, doses are      changed, or new medications (including over-the-counter products) are added. *  Please carry medication information at all times in case of emergency situations. These are general instructions for a healthy lifestyle:    No smoking/ No tobacco products/ Avoid exposure to second hand smoke  Surgeon General's Warning:  Quitting smoking now greatly reduces serious risk to your health.     Obesity, smoking, and sedentary lifestyle greatly increases your risk for illness    A healthy diet, regular physical exercise & weight monitoring are important for maintaining a healthy lifestyle    You may be retaining fluid if you have a history of heart failure or if you experience any of the following symptoms:  Weight gain of 3 pounds or more overnight or 5 pounds in a week, increased swelling in our hands or feet or shortness of breath while lying flat in bed. Please call your doctor as soon as you notice any of these symptoms; do not wait until your next office visit. Recognize signs and symptoms of STROKE:    F-face looks uneven    A-arms unable to move or move unevenly    S-speech slurred or non-existent    T-time-call 911 as soon as signs and symptoms begin-DO NOT go       Back to bed or wait to see if you get better-TIME IS BRAIN. Warning Signs of HEART ATTACK     Call 911 if you have these symptoms:   Chest discomfort. Most heart attacks involve discomfort in the center of the chest that lasts more than a few minutes, or that goes away and comes back. It can feel like uncomfortable pressure, squeezing, fullness, or pain.  Discomfort in other areas of the upper body. Symptoms can include pain or discomfort in one or both arms, the back, neck, jaw, or stomach.  Shortness of breath with or without chest discomfort.  Other signs may include breaking out in a cold sweat, nausea, or lightheadedness. Don't wait more than five minutes to call 911 - MINUTES MATTER! Fast action can save your life. Calling 911 is almost always the fastest way to get lifesaving treatment. Emergency Medical Services staff can begin treatment when they arrive -- up to an hour sooner than if someone gets to the hospital by car. The discharge information has been reviewed with the patient. The patient verbalized understanding. Discharge medications reviewed with the patient and appropriate educational materials and side effects teaching were provided. ___________________________________________________________________________________________________________________________________      No heavy lifting or strenuous activity until cleared by Dr Ananya Cardona  Please take care of your lucia catheter as instructed by your nurse.   If you have nausea, vomiting, fevers, chills, problems with your catheter or worsening pain please contact Dr Denzel Card office. DISCHARGE SUMMARY from Nurse    PATIENT INSTRUCTIONS:    After general anesthesia or intravenous sedation, for 24 hours or while taking prescription Narcotics:  · Limit your activities  · Do not drive and operate hazardous machinery  · Do not make important personal or business decisions  · Do  not drink alcoholic beverages  · If you have not urinated within 8 hours after discharge, please contact your surgeon on call. Report the following to your surgeon:  · Excessive pain, swelling, redness or odor of or around the surgical area  · Temperature over 100.5  · Nausea and vomiting lasting longer than 4 hours or if unable to take medications  · Any signs of decreased circulation or nerve impairment to extremity: change in color, persistent  numbness, tingling, coldness or increase pain  · Any questions    What to do at Home:  Recommended activity: See surgical instructions. *  Please give a list of your current medications to your Primary Care Provider. *  Please update this list whenever your medications are discontinued, doses are      changed, or new medications (including over-the-counter products) are added. *  Please carry medication information at all times in case of emergency situations. These are general instructions for a healthy lifestyle:    No smoking/ No tobacco products/ Avoid exposure to second hand smoke  Surgeon General's Warning:  Quitting smoking now greatly reduces serious risk to your health.     Obesity, smoking, and sedentary lifestyle greatly increases your risk for illness    A healthy diet, regular physical exercise & weight monitoring are important for maintaining a healthy lifestyle    You may be retaining fluid if you have a history of heart failure or if you experience any of the following symptoms:  Weight gain of 3 pounds or more overnight or 5 pounds in a week, increased swelling in our hands or feet or shortness of breath while lying flat in bed. Please call your doctor as soon as you notice any of these symptoms; do not wait until your next office visit. Recognize signs and symptoms of STROKE:    F-face looks uneven    A-arms unable to move or move unevenly    S-speech slurred or non-existent    T-time-call 911 as soon as signs and symptoms begin-DO NOT go       Back to bed or wait to see if you get better-TIME IS BRAIN. Warning Signs of HEART ATTACK     Call 911 if you have these symptoms:   Chest discomfort. Most heart attacks involve discomfort in the center of the chest that lasts more than a few minutes, or that goes away and comes back. It can feel like uncomfortable pressure, squeezing, fullness, or pain.  Discomfort in other areas of the upper body. Symptoms can include pain or discomfort in one or both arms, the back, neck, jaw, or stomach.  Shortness of breath with or without chest discomfort.  Other signs may include breaking out in a cold sweat, nausea, or lightheadedness. Don't wait more than five minutes to call 911 - MINUTES MATTER! Fast action can save your life. Calling 911 is almost always the fastest way to get lifesaving treatment. Emergency Medical Services staff can begin treatment when they arrive -- up to an hour sooner than if someone gets to the hospital by car. The discharge information has been reviewed with the patient. The patient verbalized understanding. Discharge medications reviewed with the patient and appropriate educational materials and side effects teaching were provided. ___________________________________________________________________________________________________________________________________    Patient armband removed and shredded       Learning About a Radical Prostatectomy  What is a radical prostatectomy?     A radical prostatectomy is surgery to remove the prostate gland and some of the tissue near it. It is done to treat prostate cancer that has not spread out of the prostate. The prostate gland is a small, walnut-shaped organ. It lies just below a man's bladder. It surrounds the urethra. That's the tube that carries urine from the bladder out of the body through the penis. The prostate gland makes most of the fluid in semen. When you find out that you have cancer, you may feel many emotions and need help coping. It may help to talk with your family, friends, or a therapist about your feelings. You also can do things at home to make yourself feel better while you go through treatment. Call the [employer] (5-134.337.5721) or visit its website at [employer] to learn more. How is this surgery done? There are three main types of this surgery. You and your doctor can choose which type is right for you. During surgery, the nerves that run along the sides of the prostate may be damaged or removed. These nerves affect whether a man can have an erection. In some cases, the doctor may be able to avoid damage to these nerves. This is called nerve-sparing surgery. It makes it more likely that a man can still have an erection after surgery. Radical retropubic prostatectomy  The doctor makes a 3- to 4-inch cut (incision) in the skin between the belly button and the pubic bone. This is done to remove the prostate. Nerve-sparing may be possible with this approach. Radical perineal prostatectomy  The doctor makes a cut between the scrotum and anus. The prostate and other tissue are then removed. This surgery does not take as long as a retropubic surgery. And it may cause less pain. But nerve-sparing is hard to do with this approach. So this surgery is not done as often as a retropubic prostatectomy. Laparoscopic radical prostatectomy  The doctor puts a lighted tube, or scope, and other tools through a few small cuts in the lower belly. Nerve-sparing may be possible with this approach.  This surgery may also be done with the help of a robot (robotic-assisted). Robotic arms translate the surgeon's hand motions into finer and more precise action. What can you expect after a radical prostatectomy? You will likely stay in the hospital for 1 to 3 days after surgery. Most men can go back to work or their usual routine in about 3 to 5 weeks. But it can take longer to fully recover. After your surgery, you will need to have routine checks with your doctor. This includes having blood tests to measure your PSA level. PSA is a substance that can help show if your cancer has come back. PSA tests are usually done every 3 to 6 months for the first 3 years. After that they are done less often. You may not need any more cancer treatment after surgery. But in some cases, you may need radiation or hormone therapy. Erection problems and fertility  Whether you can have an erection after surgery will depend on:  · Your age. · Whether you could have erections before the surgery. · Whether the nerves near your prostate were damaged or removed. If the doctor removes the nerves, you will need to use medicine or other treatment to have erections from now on. If your nerves are still in place, you may not need long-term medicines or treatments. As a rule, the younger you are, the better your chances that you will still be able to get an erection. Even if your nerves are not damaged, it will probably take 3 to 12 months after surgery before you will be able to have an erection on your own. During this time, you can use medicine or other treatments to get an erection. If you have this surgery, you won't be able to ejaculate sperm. That's because the surgery cuts the connection between the testicles and the penis. If you want to be able to father children, talk to your doctor about your options. You may be able to save your sperm before the surgery.   Urinary incontinence  After this surgery, some men may not be able to control when they pass urine. Or they may leak urine. This is called urinary incontinence. It may go away within weeks or months after surgery. Or it may never go away. Some men will have leakage only once in a while. But other men may find it hard to control their urine all the time. Doctors can't say who will have problems and who will not. And they can't tell how long after surgery the problem may last. Medicine and exercises to strengthen the muscles that control the bladder can often help. Follow-up care is a key part of your treatment and safety. Be sure to make and go to all appointments, and call your doctor if you are having problems. It's also a good idea to know your test results and keep a list of the medicines you take. Where can you learn more? Go to http://lula-antonia.info/. Enter C605 in the search box to learn more about \"Learning About a Radical Prostatectomy. \"  Current as of: May 12, 2017  Content Version: 11.7  © 7739-7363 [employer]. Care instructions adapted under license by "[employer] (which disclaims liability or warranty for this information). If you have questions about a medical condition or this instruction, always ask your healthcare professional. [place] [address] any warranty or liability for your use of this information. Oxybutynin (Ditropan XL) - (By mouth)   Why this medicine is used:   Treats overactive bladder. Contact a nurse or doctor right away if you have:  · Agitation, confusion, unusual behavior or drowsiness  · Seeing, hearing, or feeling things that are not there     Common side effects:  · Constipation, diarrhea  · Nausea, heartburn, stomach pain  · Dry mouth  © 2017 [address] [employer] [address] is for End User's use only and may not be sold, redistributed or otherwise used for commercial purposes.

## 2018-09-02 NOTE — PROGRESS NOTES
1047:  Patients brother, Nilo Mayo, notified that patient is ready for discharge. Mr. Nilo Mayo states he will be on his way shortly.

## 2018-09-02 NOTE — PROGRESS NOTES
Problem: Falls - Risk of  Goal: *Absence of Falls  Document Alphonse Fall Risk and appropriate interventions in the flowsheet.    Outcome: Progressing Towards Goal  Fall Risk Interventions:  Mobility Interventions: Communicate number of staff needed for ambulation/transfer, PT Consult for mobility concerns, Patient to call before getting OOB, Strengthening exercises (ROM-active/passive)         Medication Interventions: Evaluate medications/consider consulting pharmacy, Patient to call before getting OOB    Elimination Interventions: Call light in reach, Patient to call for help with toileting needs, Toileting schedule/hourly rounds    History of Falls Interventions: Evaluate medications/consider consulting pharmacy

## 2018-09-02 NOTE — DISCHARGE SUMMARY
Discharge Summary     Patient: Luis Miguel Bonilla MRN: [id number]  SSN: [id number]    YOB: 1945  Age: 68 y.o. Sex: male       Admit Date: 8/31/2018    Discharge Date: 9/2/2018      Admission Diagnoses: C61 PROSTATE CANCER;Prostate CA [facility])    Discharge Diagnoses:   Problem List as of 9/2/2018  Date Reviewed: 8/24/2018          Codes Class Noted - Resolved    Prostate CA [facility]) ICD-10-CM: C61  ICD-9-CM: 345  8/31/2018 - Present        Malignant neoplasm of prostate ([facility] [place] [facility] ICD-10-CM: Madalynn Oppenheim  ICD-9-CM: 185  7/25/2018 - Present        [facility] with [facility]ation [facility] ICD-10-CM: J44.1  ICD-9-CM: 491.21  7/9/2014 - Present               Discharge Condition: Good    Hospital Course: uneventful post-operative course after prostatectomy    Mr Dima Solitairo underwent an uncomplicated robotic prostatectomy on 8/31/18. He has done well post operatively. He had a slight rise in his Cr, over the 24 hours after his surgery though he has a degree of renal insuffiencey and ultimately his Cr has not changed since 2014. He is tolerating his lucia catheter well. He reports no fevers, chills, nausea, vomiting. He is tolerating a regular diet. Urine is clear per his lucia catheter. Abdomen soft, nt, nd  Incisions c/d/i       Consults: none    Significant Diagnostic Studies: labs: BMP    Disposition: home    Discharge Medications:   Current Discharge Medication List      START taking these medications    Details   oxyCODONE-acetaminophen (PERCOCET) 5-325 mg per tablet Take 1 Tab by mouth every four (4) hours as needed for Pain. Max Daily Amount: 6 Tabs. Qty: 30 Tab, Refills: 0      docusate sodium (COLACE) 100 mg capsule Take 1 Cap by mouth two (2) times a day for 15 days. Qty: 30 Cap, Refills: 0      oxybutynin (DITROPAN) 5 mg tablet Take 1 Tab by mouth three (3) times daily as needed.  Indications: Bladder Hyperactivity, URINARY URGENCY  Qty: 20 Tab, Refills: 0         CONTINUE these medications which have NOT CHANGED    Details   albuterol (PROVENTIL HFA) 90 mcg/actuation inhaler Take 1 Puff by inhalation every four (4) hours as needed for Wheezing. Qty: 1 Inhaler, Refills: 1      aspirin delayed-release 81 mg tablet Take  by mouth daily. atorvastatin (LIPITOR) 40 mg tablet     Associated Diagnoses: Malignant neoplasm of prostate (HCC)      SYMBICORT 160-4.5 mcg/actuation HFAA     Associated Diagnoses: Malignant neoplasm of prostate (HCC)      FLOVENT  mcg/actuation inhaler     Associated Diagnoses: Malignant neoplasm of prostate ([facility]      glimepiride (AMARYL) 4 mg tablet     Associated Diagnoses: Malignant neoplasm of prostate (HCC)      hydroCHLOROthiazide (HYDRODIURIL) 25 mg tablet     Associated Diagnoses: Malignant neoplasm of prostate (HCC)      levothyroxine (SYNTHROID) 50 mcg tablet     Associated Diagnoses: Malignant neoplasm of prostate ([facility]      pantoprazole (PROTONIX) 40 mg tablet     Associated Diagnoses: Malignant neoplasm of prostate (HCC)      triamcinolone acetonide (KENALOG) 0.1 % topical cream     Associated Diagnoses: Malignant neoplasm of prostate (HCC)      diltiazem XR (DILACOR XR) 240 mg XR capsule Take  by mouth daily. lisinopril (PRINIVIL) 20 mg tablet Take  by mouth daily. glipiZIDE (GLUCOTROL) 10 mg tablet Take 10 mg by mouth two (2) times a day. Activity: no heavy lifting or strenuous activity until cleared by Dr Nav Lim: Regular Diet  Wound Care: Keep wound clean and dry    Follow-up Appointments   Procedures    FOLLOW UP VISIT Appointment in: One Week F/u with Dr. Noreen Giordano in 1 week for lucia removal, please call [phone] (ext [phone]) with any questions. F/u with Dr. Noreen Giordano in 1 week for lucia removal, please call [phone] (ext [phone]) with any questions. Standing Status:   Standing     Number of Occurrences:   1     Order Specific Question:   Appointment in     Answer:    One Week       Signed By: Diane Stubbs MD     September 2, 2018

## 2018-09-05 NOTE — PROGRESS NOTES
Call pt with path results. Good news. Organ confined, margins clear.   Has f/u Friday for lucia removal.

## 2018-12-27 ENCOUNTER — DOCUMENTATION ONLY (OUTPATIENT)
Dept: ONCOLOGY | Age: 73
End: 2018-12-27

## 2018-12-27 NOTE — PROGRESS NOTES
Survivorship Care Plan mailed to patient with letter requesting pt call to review. Faxed to PCP and scanned to [email or web address].

## 2019-01-15 ENCOUNTER — DOCUMENTATION ONLY (OUTPATIENT)
Dept: ONCOLOGY | Age: 74
End: 2019-01-15

## 2019-02-13 NOTE — PROGRESS NOTES
Pt has not returned call to review Survivorship Care Plan. Pt has contact information if pt wants to contact RN in the future.

## 2019-05-08 ENCOUNTER — HOSPITAL ENCOUNTER (EMERGENCY)
Age: 74
Discharge: HOME | End: 2019-05-08
Attending: EMERGENCY MEDICINE
Payer: MEDICARE

## 2019-05-08 ENCOUNTER — APPOINTMENT (OUTPATIENT)
Dept: GENERAL RADIOLOGY | Age: 74
End: 2019-05-08
Attending: PHYSICIAN ASSISTANT
Payer: MEDICARE

## 2019-05-08 VITALS
DIASTOLIC BLOOD PRESSURE: 72 MMHG | TEMPERATURE: 98 F | HEART RATE: 64 BPM | OXYGEN SATURATION: 100 % | RESPIRATION RATE: 18 BRPM | BODY MASS INDEX: 23.1 KG/M2 | WEIGHT: 165 LBS | SYSTOLIC BLOOD PRESSURE: 148 MMHG | HEIGHT: 71 IN

## 2019-05-08 DIAGNOSIS — L02.511 ABSCESS OF RIGHT INDEX FINGER: Primary | ICD-10-CM

## 2019-05-08 PROCEDURE — 99283 EMERGENCY DEPT VISIT LOW MDM: CPT

## 2019-05-08 PROCEDURE — 73140 X-RAY EXAM OF FINGER(S): CPT

## 2019-05-08 PROCEDURE — 74011000250 HC RX REV CODE- 250: Performed by: PHYSICIAN ASSISTANT

## 2019-05-08 PROCEDURE — 87070 CULTURE OTHR SPECIMN AEROBIC: CPT

## 2019-05-08 PROCEDURE — 75810000139 HC PUNCT ASPIRATION ABCESS

## 2019-05-08 RX ORDER — CEPHALEXIN 500 MG/1
500 CAPSULE ORAL 4 TIMES DAILY
Qty: 28 CAP | Refills: 0 | Status: SHIPPED | OUTPATIENT
Start: 2019-05-08 | End: 2019-05-15

## 2019-05-08 RX ORDER — HYDROCODONE BITARTRATE AND ACETAMINOPHEN 5; 325 MG/1; MG/1
1 TABLET ORAL
Qty: 10 TAB | Refills: 0 | Status: SHIPPED | OUTPATIENT
Start: 2019-05-08 | End: 2019-05-11

## 2019-05-08 RX ADMIN — Medication 5 ML: at 16:38

## 2019-05-08 NOTE — DISCHARGE INSTRUCTIONS
Wash with soap and water daily. Dress with small amount of bacitracin, gauze and coban. Keep affected limb elevated. Apply ice in 20 minute intervals throughout the day. Take NSAIDs such as tylenol or ibuprofin as directed for pain control. Do not take on an empty stomach. Narcotics cannot be taken while driving. Patient Education        Skin Abscess: Care Instructions  Your Care Instructions    A skin abscess is a bacterial infection that forms a pocket of pus. A boil is a kind of skin abscess. The doctor may have cut an opening in the abscess so that the pus can drain out. You may have gauze in the cut so that the abscess will stay open and keep draining. You may need antibiotics. You will need to follow up with your doctor to make sure the infection has gone away. The doctor has checked you carefully, but problems can develop later. If you notice any problems or new symptoms, get medical treatment right away. Follow-up care is a key part of your treatment and safety. Be sure to make and go to all appointments, and call your doctor if you are having problems. It's also a good idea to know your test results and keep a list of the medicines you take. How can you care for yourself at home? · Apply warm and dry compresses, a heating pad set on low, or a hot water bottle 3 or 4 times a day for pain. Keep a cloth between the heat source and your skin. · If your doctor prescribed antibiotics, take them as directed. Do not stop taking them just because you feel better. You need to take the full course of antibiotics. · Take pain medicines exactly as directed. ? If the doctor gave you a prescription medicine for pain, take it as prescribed. ? If you are not taking a prescription pain medicine, ask your doctor if you can take an over-the-counter medicine. · Keep your bandage clean and dry. Change the bandage whenever it gets wet or dirty, or at least one time a day.   · If the abscess was packed with gauze:  ? Keep follow-up appointments to have the gauze changed or removed. If the doctor instructed you to remove the gauze, follow the instructions you were given for how to remove it. ? After the gauze is removed, soak the area in warm water for 15 to 20 minutes 2 times a day, until the wound closes. When should you call for help? Call your doctor now or seek immediate medical care if:    · You have signs of worsening infection, such as:  ? Increased pain, swelling, warmth, or redness. ? Red streaks leading from the infected skin. ? Pus draining from the wound. ? A fever.    Watch closely for changes in your health, and be sure to contact your doctor if:    · You do not get better as expected. Where can you learn more? Go to http://lula-antonia.info/. Enter O156 in the search box to learn more about \"Skin Abscess: Care Instructions. \"  Current as of: April 17, 2018  Content Version: 11.9  © 5839-9966 [employer]. Care instructions adapted under license by [employer] (which disclaims liability or warranty for this information). If you have questions about a medical condition or this instruction, always ask your healthcare professional. [place] [address] any warranty or liability for your use of this information.

## 2019-05-08 NOTE — ED PROVIDER NOTES
EMERGENCY DEPARTMENT HISTORY AND PHYSICAL EXAM 
 
2:47 PM 
 
 
Date: 5/8/2019 Patient Name: Earlene Jaime History of Presenting Illness Chief Complaint Patient presents with  Finger Pain History Provided By: Patient Chief Complaint: right index finger pain Additional History (Context): Earlene Jaime is a 69 y.o. male with diabetes, hypertension, hyperlipidemia and prostate cancer who presents with right index finger swelling and pain. He hit it on a shelf 1 week ago and it has been getting worse since then. Denies fevers. He has not been taking any medication for the pain. Hurts to bend at that joint p. Denies any fevers. Pastora Lara PCP: Benitez Carter MD 
 
Current Outpatient Medications Medication Sig Dispense Refill  cephALEXin (KEFLEX) 500 mg capsule Take 1 Cap by mouth four (4) times daily for 7 days. 28 Cap 0  
 HYDROcodone-acetaminophen (NORCO) 5-325 mg per tablet Take 1 Tab by mouth every four (4) hours as needed for Pain for up to 3 days. Max Daily Amount: 6 Tabs. 10 Tab 0  
 atorvastatin (LIPITOR) 40 mg tablet  SYMBICORT 160-4.5 mcg/actuation HFAA  glimepiride (AMARYL) 4 mg tablet  hydroCHLOROthiazide (HYDRODIURIL) 25 mg tablet  levothyroxine (SYNTHROID) 50 mcg tablet  pantoprazole (PROTONIX) 40 mg tablet  diltiazem XR (DILACOR XR) 240 mg XR capsule Take  by mouth daily.  lisinopril (PRINIVIL) 20 mg tablet Take  by mouth daily.  glipiZIDE (GLUCOTROL) 10 mg tablet Take 10 mg by mouth two (2) times a day.  FLOVENT  mcg/actuation inhaler  albuterol (PROVENTIL HFA) 90 mcg/actuation inhaler Take 1 Puff by inhalation every four (4) hours as needed for Wheezing. 1 Inhaler 1 Past History Past Medical History: 
Past Medical History:  
Diagnosis Date  Arthritis  Cancer ([facility] [address] Prostate  Diabetes mellitus ([facility] [address]  Elevated PSA  GERD (gastroesophageal reflux disease)  Hypercholesteremia  Hypertension  Nodular prostate without urinary obstruction  Thyroid disease Past Surgical History: 
Past Surgical History:  
Procedure Laterality Date  HX RADICAL PROSTATECTOMY  09/04/2018  
 [id number], GS 4+3  
 HX UROLOGICAL N/A 06/15/2018 PNBx-TRUS Vol. 35.6cc's, [place] 8(4+4) R mid/R base/R lateral mid/R lateral base, [place] 6(3+3) L latera base -- Dr Em Graham Family History: 
Family History Family history unknown: Yes  
 
 
Social History: 
Social History Tobacco Use  Smoking status: Former Smoker Packs/day: 0.50 Years: 40.00 Pack years: 20.00 Types: Cigarettes  Smokeless tobacco: Former User Quit date: 7/7/2009  Tobacco comment: Prior to quiting 1 pack per day Substance Use Topics  Alcohol use: Yes Comment: occasionally  Drug use: Yes Types: Marijuana Comment: last used 08/28/2018 Allergies: 
No Known Allergies Review of Systems Review of Systems Constitutional: Negative for fever. HENT: Negative for facial swelling. Eyes: Negative for visual disturbance. Respiratory: Negative for shortness of breath. Cardiovascular: Negative for chest pain. Gastrointestinal: Negative for abdominal pain. Genitourinary: Negative for dysuria. Musculoskeletal: Positive for arthralgias and joint swelling. Negative for neck pain. Skin: Negative for rash. Neurological: Negative for dizziness. Psychiatric/Behavioral: Negative for confusion. All other systems reviewed and are negative. Physical Exam  
 
Visit Vitals /72 Pulse 64 Temp 98 °F (36.7 °C) Resp 18 Ht 5' 11\" (1.803 m) Wt 74.8 kg (165 lb) SpO2 100% BMI 23.01 kg/m² Physical Exam  
Constitutional: He appears well-developed and well-nourished. No distress. HENT:  
Head: Normocephalic and atraumatic. Eyes: Conjunctivae are normal.  
Neck: Normal range of motion. Neck supple. Cardiovascular: Normal rate. Pulmonary/Chest: Effort normal.  
Abdominal: Soft. Musculoskeletal: Normal range of motion. Right index finger range of motion limited at PIP joint 90 degrees due to pain. Neurological: He is alert. Skin: Skin is warm and dry. He is not diaphoretic. Round fluctuant tender nodule over right index finger PIP joint with mild surrounding swelling and minimal erythema and warmth. Psychiatric: He has a normal mood and affect. Nursing note and vitals reviewed. Diagnostic Study Results Labs - No results found for this or any previous visit (from the past 12 hour(s)). Radiologic Studies -  
XR 2ND FINGER RT MIN 2 V Final Result IMPRESSION:  
  
  
1. No acute bony abnormality. Degenerative change with significant soft tissue  
swelling at the level of the PIP joint second finger. Similar soft tissue  
swelling seen at this level on right hand series 2012.   
2. Significant degenerative changes of the first through third MCP joints with  
osteophytes the second and third distal metacarpals. This can be seen with  
calcium pyrophosphate deposition disease. Medical Decision Making I am the first provider for this patient. I reviewed the vital signs, available nursing notes, past medical history, past surgical history, family history and social history. Vital Signs-Reviewed the patient's vital signs. Records Reviewed: Nursing Notes (Time of Review: 2:47 PM) ED Course: Progress Notes, Reevaluation, and Consults: 
 
 
Provider Notes (Medical Decision Making): MDM Number of Diagnoses or Management Options Abscess of right index finger:  
Diagnosis management comments: Cystic mass over right index finger. He also has multiple cysts over other areas of his hand and joints. Needle aspiration of the mass produced purulent drainage. Sent for culture. Started on antibiotics. X-ray is negative for joint breakdown.   Vital signs are stable he is not septic. Recommended antibiotics and reassessment in 2 days. I chose not to make an incision because it is over the joint and will have difficulty healing. Discussed treatment plan, return precautions, symptomatic relief, and expected time to improvement. All questions answered. Patient is stable for discharge and outpatient management. I&D Abcess Simple Date/Time: 5/8/2019 5:34 PM 
Performed by: Omid Walton PA-C Authorized by: Omid Walton PA-C Consent:  
  Consent obtained:  Verbal 
  Consent given by:  Patient Risks discussed:  Bleeding, damage to other organs, infection, incomplete drainage and pain Alternatives discussed:  Alternative treatment Location:  
  Type:  Cyst 
  Location:  Upper extremity Upper extremity location:  Finger Finger location:  R index finger Pre-procedure details:  
  Skin preparation:  Betadine Anesthesia (see MAR for exact dosages): Anesthesia method:  Topical application Topical anesthetic:  LET Procedure type:  
  Complexity:  Simple Procedure details:  
  Needle aspiration: yes Needle size:  18 G Drainage:  Purulent Drainage amount:  Scant Packing materials:  None Post-procedure details:  
  Patient tolerance of procedure: Tolerated well, no immediate complications Diagnosis Clinical Impression: 1. Abscess of right index finger Disposition: Discharged Follow-up Information Follow up With Specialties Details Why Contact Info Laya Reyes MD Family Practice In 2 days For wound re-check C/ [address] [address] [phone] [facility] EMERGENCY DEPT Emergency Medicine  Immediately if symptoms worsen [address] 
[phone] Gerald Anderson MD Orthopedic Surgery, Orthopedic Surgery, Orthopedic Surgery Schedule an appointment as soon as possible for a visit  [place] [address] [address] [facility] [address] 
[phone] Discharge Medication List as of 5/8/2019  5:10 PM  
  
START taking these medications Details  
cephALEXin (KEFLEX) 500 mg capsule Take 1 Cap by mouth four (4) times daily for 7 days. , Print, Disp-28 Cap, R-0  
  
HYDROcodone-acetaminophen (NORCO) 5-325 mg per tablet Take 1 Tab by mouth every four (4) hours as needed for Pain for up to 3 days. Max Daily Amount: 6 Tabs., Print, Disp-10 Tab, R-0  
  
  
CONTINUE these medications which have NOT CHANGED Details  
atorvastatin (LIPITOR) 40 mg tablet Historical Med SYMBICORT 160-4.5 mcg/actuation HFAA Historical Med, [place]LAYNE  
  
glimepiride (AMARYL) 4 mg tablet Historical Med  
  
hydroCHLOROthiazide (HYDRODIURIL) 25 mg tablet Historical Med  
  
levothyroxine (SYNTHROID) 50 mcg tablet Historical Med  
  
pantoprazole (PROTONIX) 40 mg tablet Historical Med  
  
diltiazem XR (DILACOR XR) 240 mg XR capsule Take  by mouth daily. , Historical Med  
  
lisinopril (PRINIVIL) 20 mg tablet Take  by mouth daily. , Historical Med  
  
glipiZIDE (GLUCOTROL) 10 mg tablet Take 10 mg by mouth two (2) times a day.  , Historical Med FLOVENT  mcg/actuation inhaler Historical Med, BLAYNE  
  
albuterol (PROVENTIL HFA) 90 mcg/actuation inhaler Take 1 Puff by inhalation every four (4) hours as needed for Wheezing., Print, Disp-1 Inhaler, R-1  
  
  
 
_______________________________ Attestations: 
Scribe Attestation Tj Franco PA-C acting as a scribe for and in the presence of [employer], [employer] May 08, 2019 at 5:34 PM 
    
Provider Attestation:     
I personally performed the services described in the documentation, reviewed the documentation, as recorded by the scribe in my presence, and it accurately and completely records my words and actions. May 08, 2019 at 5:34 PM - RUSS Heller 
_______________________________

## 2019-05-12 LAB
BACTERIA SPEC CULT: NORMAL
GRAM STN SPEC: NORMAL
GRAM STN SPEC: NORMAL
SERVICE CMNT-IMP: NORMAL

## 2019-08-21 ENCOUNTER — ANESTHESIA EVENT (OUTPATIENT)
Dept: ENDOSCOPY | Age: 74
End: 2019-08-21
Payer: MEDICARE

## 2019-08-21 NOTE — PERIOP NOTES
PAT - SURGICAL PRE-ADMISSION INSTRUCTIONS    NAME:  Candace Meyer                                                          TODAY'S DATE:  8/21/2019    SURGERY DATE:  8/22/2019                                  SURGERY ARRIVAL TIME:   0630    1. Do NOT eat or drink anything, including candy or gum, after MIDNIGHT on 8/21/19 , unless you have specific instructions from your Surgeon or Anesthesia Provider to do so. 2. No smoking on the day of surgery. 3. No alcohol 24 hours prior to the day of surgery. 4. No recreational drugs for one week prior to the day of surgery. 5. Leave all valuables, including money/purse, at home. 6. Remove all jewelry, nail polish, makeup (including mascara); no lotions, powders, deodorant, or perfume/cologne/after shave. 7. Glasses/Contact lenses and Dentures may be worn to the hospital.  They will be removed prior to surgery. 8. Call your doctor if symptoms of a cold or illness develop within 24 ours prior to surgery. 9. AN ADULT MUST DRIVE YOU HOME AFTER OUTPATIENT SURGERY. 10. If you are having an OUTPATIENT procedure, please make arrangements for a responsible adult to be with you for 24 hours after your surgery. 11. If you are admitted to the hospital, you will be assigned to a bed after surgery is complete. Normally a family member will not be able to see you until you are in your assigned bed. 15. Family is encouraged to accompany you to the hospital.  We ask visitors in the treatment area to be limited to ONE person at a time to ensure patient privacy. EXCEPTIONS WILL BE MADE AS NEEDED. 15. Children under 12 are discouraged from entering the treatment area and need to be supervised by an adult when in the waiting room. Special Instructions:    Bring inhaler. , Take these medications the morning of surgery with a sip of water:  DILTIAZEM, LEVOTHYROXINE, INHALERS, HOLD oral diabetic medication on the MORNING OF surgery. , Complete bowel prep per MD instructions. Patient Prep:    shower with anti-bacterial soap    These surgical instructions were reviewed with PATIENT during the PAT PHONE CALL. Directions: On the morning of surgery, please go to the [facility]. Enter the building from the [facility] Emergency Department entrance, 1st floor (next to the Emergency Room entrance). Take the elevator to the 2nd floor. Sign in at the Registration Desk.     If you have any questions and/or concerns, please do not hesitate to call:  (Prior to the day of surgery)  P[facility] unit:  [phone]  (Day of surgery)  [address] unit:  [phone]

## 2019-08-22 ENCOUNTER — HOSPITAL ENCOUNTER (OUTPATIENT)
Age: 74
Setting detail: OUTPATIENT SURGERY
Discharge: HOME | End: 2019-08-22
Attending: INTERNAL MEDICINE | Admitting: INTERNAL MEDICINE
Payer: MEDICARE

## 2019-08-22 ENCOUNTER — ANESTHESIA (OUTPATIENT)
Dept: ENDOSCOPY | Age: 74
End: 2019-08-22
Payer: MEDICARE

## 2019-08-22 VITALS
BODY MASS INDEX: 22.9 KG/M2 | HEART RATE: 64 BPM | DIASTOLIC BLOOD PRESSURE: 44 MMHG | OXYGEN SATURATION: 100 % | RESPIRATION RATE: 14 BRPM | WEIGHT: 163.6 LBS | HEIGHT: 71 IN | TEMPERATURE: 98.1 F | SYSTOLIC BLOOD PRESSURE: 110 MMHG

## 2019-08-22 PROBLEM — R19.5 HEME POSITIVE STOOL: Status: ACTIVE | Noted: 2019-08-22

## 2019-08-22 LAB — GLUCOSE BLD STRIP.AUTO-MCNC: 78 MG/DL (ref 70–110)

## 2019-08-22 PROCEDURE — 77030039961 HC KT CUST COLON BSC -D: Performed by: INTERNAL MEDICINE

## 2019-08-22 PROCEDURE — 74011250636 HC RX REV CODE- 250/636

## 2019-08-22 PROCEDURE — 77030034690 HC DEV ENDO SNR RETRV STRC -B: Performed by: INTERNAL MEDICINE

## 2019-08-22 PROCEDURE — 74011250636 HC RX REV CODE- 250/636: Performed by: NURSE ANESTHETIST, CERTIFIED REGISTERED

## 2019-08-22 PROCEDURE — 82962 GLUCOSE BLOOD TEST: CPT

## 2019-08-22 PROCEDURE — 76060000032 HC ANESTHESIA 0.5 TO 1 HR: Performed by: INTERNAL MEDICINE

## 2019-08-22 PROCEDURE — 88305 TISSUE EXAM BY PATHOLOGIST: CPT

## 2019-08-22 PROCEDURE — 77030019988 HC FCPS ENDOSC DISP BSC -B: Performed by: INTERNAL MEDICINE

## 2019-08-22 PROCEDURE — 76040000007: Performed by: INTERNAL MEDICINE

## 2019-08-22 RX ORDER — SODIUM CHLORIDE 0.9 % (FLUSH) 0.9 %
5-40 SYRINGE (ML) INJECTION AS NEEDED
Status: CANCELLED | OUTPATIENT
Start: 2019-08-22

## 2019-08-22 RX ORDER — LIDOCAINE HYDROCHLORIDE 20 MG/ML
INJECTION, SOLUTION EPIDURAL; INFILTRATION; INTRACAUDAL; PERINEURAL AS NEEDED
Status: DISCONTINUED | OUTPATIENT
Start: 2019-08-22 | End: 2019-08-22 | Stop reason: HOSPADM

## 2019-08-22 RX ORDER — SODIUM CHLORIDE 0.9 % (FLUSH) 0.9 %
5-40 SYRINGE (ML) INJECTION EVERY 8 HOURS
Status: CANCELLED | OUTPATIENT
Start: 2019-08-22

## 2019-08-22 RX ORDER — INSULIN LISPRO 100 [IU]/ML
INJECTION, SOLUTION INTRAVENOUS; SUBCUTANEOUS ONCE
Status: DISCONTINUED | OUTPATIENT
Start: 2019-08-22 | End: 2019-08-22 | Stop reason: HOSPADM

## 2019-08-22 RX ORDER — MAGNESIUM SULFATE 100 %
4 CRYSTALS MISCELLANEOUS AS NEEDED
Status: DISCONTINUED | OUTPATIENT
Start: 2019-08-22 | End: 2019-08-22 | Stop reason: HOSPADM

## 2019-08-22 RX ORDER — SODIUM CHLORIDE 0.9 % (FLUSH) 0.9 %
5-40 SYRINGE (ML) INJECTION EVERY 8 HOURS
Status: DISCONTINUED | OUTPATIENT
Start: 2019-08-22 | End: 2019-08-22 | Stop reason: HOSPADM

## 2019-08-22 RX ORDER — SODIUM CHLORIDE 0.9 % (FLUSH) 0.9 %
5-40 SYRINGE (ML) INJECTION AS NEEDED
Status: DISCONTINUED | OUTPATIENT
Start: 2019-08-22 | End: 2019-08-22 | Stop reason: HOSPADM

## 2019-08-22 RX ORDER — DEXTROMETHORPHAN/PSEUDOEPHED 2.5-7.5/.8
1.2 DROPS ORAL
Status: CANCELLED | OUTPATIENT
Start: 2019-08-22

## 2019-08-22 RX ORDER — PROPOFOL 10 MG/ML
INJECTION, EMULSION INTRAVENOUS AS NEEDED
Status: DISCONTINUED | OUTPATIENT
Start: 2019-08-22 | End: 2019-08-22 | Stop reason: HOSPADM

## 2019-08-22 RX ORDER — LIDOCAINE HYDROCHLORIDE 10 MG/ML
0.1 INJECTION, SOLUTION EPIDURAL; INFILTRATION; INTRACAUDAL; PERINEURAL AS NEEDED
Status: DISCONTINUED | OUTPATIENT
Start: 2019-08-22 | End: 2019-08-22 | Stop reason: HOSPADM

## 2019-08-22 RX ORDER — SODIUM CHLORIDE, SODIUM LACTATE, POTASSIUM CHLORIDE, CALCIUM CHLORIDE 600; 310; 30; 20 MG/100ML; MG/100ML; MG/100ML; MG/100ML
25 INJECTION, SOLUTION INTRAVENOUS CONTINUOUS
Status: DISCONTINUED | OUTPATIENT
Start: 2019-08-22 | End: 2019-08-22 | Stop reason: HOSPADM

## 2019-08-22 RX ADMIN — PROPOFOL 50 MG: 10 INJECTION, EMULSION INTRAVENOUS at 08:49

## 2019-08-22 RX ADMIN — PROPOFOL 20 MG: 10 INJECTION, EMULSION INTRAVENOUS at 09:05

## 2019-08-22 RX ADMIN — SODIUM CHLORIDE, SODIUM LACTATE, POTASSIUM CHLORIDE, AND CALCIUM CHLORIDE 25 ML/HR: 600; 310; 30; 20 INJECTION, SOLUTION INTRAVENOUS at 08:05

## 2019-08-22 RX ADMIN — PROPOFOL 50 MG: 10 INJECTION, EMULSION INTRAVENOUS at 08:55

## 2019-08-22 RX ADMIN — PROPOFOL 50 MG: 10 INJECTION, EMULSION INTRAVENOUS at 08:51

## 2019-08-22 RX ADMIN — LIDOCAINE HYDROCHLORIDE 40 MG: 20 INJECTION, SOLUTION EPIDURAL; INFILTRATION; INTRACAUDAL; PERINEURAL at 08:49

## 2019-08-22 RX ADMIN — PROPOFOL 50 MG: 10 INJECTION, EMULSION INTRAVENOUS at 08:58

## 2019-08-22 RX ADMIN — PROPOFOL 20 MG: 10 INJECTION, EMULSION INTRAVENOUS at 09:02

## 2019-08-22 RX ADMIN — PROPOFOL 20 MG: 10 INJECTION, EMULSION INTRAVENOUS at 09:10

## 2019-08-22 NOTE — PERIOP NOTES
Phase 2 Recovery Summary  Patient arrived to Phase 2 at 0918    Vitals:    08/21/19 1306 08/22/19 0743   BP:  160/65   Pulse:  (!) 58   Resp:  18   Temp:  98.1 °F (36.7 °C)   SpO2:  98%   Weight: 75.8 kg (167 lb) 74.2 kg (163 lb 9.6 oz)   Height: 5' 11\" (1.803 m) 5' 11\" (1.803 m)     A&Ox4. No c/o of pain, nausea, or dizziness. Family brought back to recovery room. Patient ambulated from bed to chair with minimal assistance. IV removed and patient allowed to get dressed. Reviewed d/c instructions with patient and family. Family signed for d/c. Patient transported to vehicle via wheelchair.          Patient discharged to home with family    Galileo Chan RN

## 2019-08-22 NOTE — ANESTHESIA PREPROCEDURE EVALUATION
Relevant Problems   No relevant active problems       Anesthetic History   No history of anesthetic complications            Review of Systems / Medical History  Patient summary reviewed, nursing notes reviewed and pertinent labs reviewed    Pulmonary    COPD        Asthma        Neuro/Psych              Cardiovascular    Hypertension                   GI/Hepatic/Renal     GERD           Endo/Other    Diabetes  Hypothyroidism  Arthritis     Other Findings              Physical Exam    Airway  Mallampati: III  TM Distance: 4 - 6 cm  Neck ROM: normal range of motion   Mouth opening: Normal     Cardiovascular  Regular rate and rhythm,  S1 and S2 normal,  no murmur, click, rub, or gallop             Dental    Dentition: Poor dentition     Pulmonary  Breath sounds clear to auscultation               Abdominal  GI exam deferred       Other Findings            Anesthetic Plan    ASA: 3  Anesthesia type: MAC          Induction: Intravenous  Anesthetic plan and risks discussed with: Patient

## 2019-08-22 NOTE — PROCEDURES
Colonoscopy Report     Date of Procedure:2019       Patient: Abhishek Snow  Date of Birth: @     MRN: [id number]   Age: 69 y.o.   SSN: [id number]  Sex: male            FINDINGS & IMPRESSION:  1. 12 mm semi-pedunculated polyp in the sigmoid at 25 cm -- hot snared and retrieved. 2. Four (4), 3-4 mm, sessile polyps in the sigmoid at 20 cm -- removed with biopsy forceps. 3. 6 mm sessile polyp in the descending colon at 40 cm -- removed with biopsy forceps. 4. Moderate sigmoid diverticulosis with tortuous anatomy. 5. Grade 1 hemorrhoids. RECOMMENDATIONS  1. Resume all home medications and diet. 2. Await results of biopsies. Patient advised results will be mailed in 1-2 weeks. 3. Repeat colonoscopy in 3 years if  > 3 polyps are adenomas or if, at least, one polyp has villous histology. Repeat in 5 years if < 3 polyps are adenomas. Repeat in 10 years if all polyps are hyperplastic. 4. Minimize NSAID use. 5. Return to Dr. Nathen Carranza and follow-up prn. Indication:  Occult blood in stool  Procedure Performed: Colonoscopy biopsy, polypectomy (snare cautery)  Endoscopist: Nicole Cantu MD  Assistant: Endoscopy Technician-1: Radha Diez RN-1: Max Valle RN  ASA: ASA 3 - Patient with moderate systemic disease with functional limitations  Mallampati Score: II (soft palate, uvula, fauces visible)  Anesthesia: MAC anesthesia  Endoscope: [id number]  Extent of Examination:cecum, which was identified by the ileocecal valve and appendiceal orifice  Quality of Preparation:     [x]  Excellent   []  Very Good   [] Fair but adequate   [] Fair, inadequate   []  Poor      Technique: Informed consent was obtained. A safety timeout was performed. The patient was placed in left lateral position. A perianal inspection and a digital rectal exam were performed.   The patients vital signs were monitored at all times including heart rate, rhythm, blood pressure and oxygen saturation. Sedation/anesthesia was administered. When adequate sedation was achieved the video colonoscope was introduced into the rectum and advanced under direct vision up to the cecum, which was identified by the ileocecal valve and appendiceal orifice. The terminal ileum was not intubated. With adequate insufflation and maneuvering of the withdrawing scope, the colonic mucosa was visualized carefully. Retroflexion was performed in the rectum and the distal rectum visualized. The patient tolerated the procedure very well and was transferred to recovery area. EBL:   Mild    Complications:  None. Specimen:   ID Type Source Tests Collected by Time Destination   1 : bx polyp @ 40 cm  Preservative Colon  Yvonne Vargas MD 8/22/2019 0900 Pathology   2 : (hot snare) polyp @ 20 cm  Preservative Sigmoid  Yvonne Vargas MD 8/22/2019 0912 Pathology   3 : bx polyps x4 @ 20 cm  Preservative Sigmoid  Yvonne Vargas MD 8/22/2019 0912 Pathology   4 : bx polyp  Preservative Rectum  Yvonne Vargas MD 8/22/2019 8158 Pathology       Prosthetic devices or implants:  None. Pancho Santos MD, [address]  [facility]  August 22, 2019  9:18 AM

## 2019-08-22 NOTE — PERIOP NOTES
Pre-Op Summary    Pt arrived via car with family/friend and is oriented to time, place, person and situation. Patient with steady gait with none assistive devices. Visit Vitals  /65 (BP 1 Location: Right arm, BP Patient Position: At rest)   Pulse (!) 58   Temp 98.1 °F (36.7 °C)   Resp 18   Ht 5' 11\" (1.803 m)   Wt 74.2 kg (163 lb 9.6 oz)   SpO2 98%   BMI 22.82 kg/m²       Peripheral IV located on Left forearm. Patients belongings are located with family. Patient's point of contact is Ruddy Romero and their contact number is: [phone]. They will be in the waiting room. They are able to receive medication information. They will be their ride home.

## 2019-08-22 NOTE — H&P
History and Physical    Vaishnavi Romero  [birth year]  [id number]  [id number]    Assessment:  Heme positive stool    Treatment/Plan:  Colonoscopy    History of present illness:  69 y.o. male here for Colonoscopy to eval heme positive stool. ROS: No chest pain, shortness of breath, headaches, dizziness, lightheadedness, nausea, vomiting or abdominal pain. Evaluation of past illnesses, surgeries, or injuries:  yes    Past Medical History:   Diagnosis Date    Arthritis     Asthma     Cancer ([facility] [address]     Prostate     Chronic obstructive pulmonary disease (HCC)     Diabetes mellitus ([facility] [address]     Elevated PSA     GERD (gastroesophageal reflux disease)     Hypercholesteremia     Hypertension     Nodular prostate without urinary obstruction     Thyroid disease        Past Surgical History:   Procedure Laterality Date    HX COLONOSCOPY      HX RADICAL PROSTATECTOMY  09/04/2018    [id number], GS 4+3    HX UROLOGICAL N/A 06/15/2018    PNBx-TRUS Vol. 35.6cc's, [place] 8(4+4) R mid/R base/R lateral mid/R lateral base, [place] 6(3+3) L latera base -- Dr Yuridia Joyner       Allergies:  No Known Allergies    Previous reactions to sedation/analgesia? no     Review of current medications, supplement, herbals and nutraceuticals complete:  yes    Pertinent labs reviewed?   yes    History of active substance abuse?  no    Family History   Family history unknown: Yes       Social History     Socioeconomic History    Marital status: SINGLE     Spouse name: Not on file    Number of children: Not on file    Years of education: Not on file    Highest education level: Not on file   Occupational History    Not on file   Social Needs    Financial resource strain: Not on file    Food insecurity:     Worry: Not on file     Inability: Not on file    Transportation needs:     Medical: Not on file     Non-medical: Not on file   Tobacco Use    Smoking status: Former Smoker     Packs/day: 0.50     Years: 40.00     Pack years: 20.00     Types: Cigarettes    Smokeless tobacco: Former User     Quit date: 7/7/2009    Tobacco comment: Prior to quiting 1 pack per day   Substance and Sexual Activity    Alcohol use: Yes     Comment: occasionally    Drug use: Yes     Types: Marijuana     Comment: last used 08/28/2018    Sexual activity: Not on file   Lifestyle    Physical activity:     Days per week: Not on file     Minutes per session: Not on file    Stress: Not on file   Relationships    Social connections:     Talks on phone: Not on file     Gets together: Not on file     Attends [employer] service: Not on file     Active member of club or organization: Not on file     Attends meetings of clubs or organizations: Not on file     Relationship status: Not on file    Intimate partner violence:     Fear of current or ex partner: Not on file     Emotionally abused: Not on file     Physically abused: Not on file     Forced sexual activity: Not on file   Other Topics Concern    Not on file   Social History Narrative    Not on file       Examination:  Cardiac Status:  WNL    Mental Status:  WNL     Pulmonary Status:  WNL    NPO:  9-12    Pancho Browning MD, [address]  8/22/2019  8:47 AM

## 2019-08-22 NOTE — DISCHARGE INSTRUCTIONS
DISCHARGE SUMMARY from Nurse    PATIENT INSTRUCTIONS:        For the next 12 hours you should not:   1. drive   2. drink alcohol   3. operate any machinery   4. engage in activities that require mental sharpness or manual dexterity    5. take any drugs other than those prescribed by a physician   6. make any legal or financial decisions    Call your doctor's office immediately, if:   1. Severe rectal bleeding   2. High fever   3. Severe abdominal pain    Take it easy today and resume normal activity tomorrow. Resume previous diet. Pancho Chang MD, FACP                After general anesthesia or intravenous sedation, for 24 hours or while taking prescription Narcotics:  · Limit your activities  · Do not drive and operate hazardous machinery  · Do not make important personal or business decisions  · Do  not drink alcoholic beverages  · If you have not urinated within 8 hours after discharge, please contact your surgeon on call. Report the following to your surgeon:  · Excessive pain, swelling, redness or odor of or around the surgical area  · Temperature over 100.5  · Nausea and vomiting lasting longer than 4 hours or if unable to take medications  · Any signs of decreased circulation or nerve impairment to extremity: change in color, persistent  numbness, tingling, coldness or increase pain  · Any questions    These are general instructions for a healthy lifestyle:    No smoking/ No tobacco products/ Avoid exposure to second hand smoke  Surgeon General's Warning:  Quitting smoking now greatly reduces serious risk to your health.     Obesity, smoking, and sedentary lifestyle greatly increases your risk for illness    A healthy diet, regular physical exercise & weight monitoring are important for maintaining a healthy lifestyle    You may be retaining fluid if you have a history of heart failure or if you experience any of the following symptoms:  Weight gain of 3 pounds or more overnight or 5 pounds in a week, increased swelling in our hands or feet or shortness of breath while lying flat in bed. Please call your doctor as soon as you notice any of these symptoms; do not wait until your next office visit. Patient armband removed and given to patient to take home. Patient was informed of the privacy risks if armband lost or stolen    The discharge information has been reviewed with the patient. The patient verbalized understanding. Discharge medications reviewed with the patient and appropriate educational materials and side effects teaching were provided.   ___________________________________________________________________________________________________________________________________

## 2019-10-17 ENCOUNTER — HOSPITAL ENCOUNTER (OUTPATIENT)
Dept: LAB | Age: 74
Discharge: HOME | End: 2019-10-17
Payer: MEDICARE

## 2019-10-17 LAB
25(OH)D3 SERPL-MCNC: 29.3 NG/ML (ref 30–100)
ALBUMIN SERPL-MCNC: 4 G/DL (ref 3.4–5)
ANION GAP SERPL CALC-SCNC: 8 MMOL/L (ref 3–18)
BUN SERPL-MCNC: 27 MG/DL (ref 7–18)
BUN/CREAT SERPL: 13 (ref 12–20)
CALCIUM SERPL-MCNC: 9.3 MG/DL (ref 8.5–10.1)
CALCIUM SERPL-MCNC: 9.3 MG/DL (ref 8.5–10.1)
CHLORIDE SERPL-SCNC: 104 MMOL/L (ref 100–111)
CO2 SERPL-SCNC: 26 MMOL/L (ref 21–32)
CREAT SERPL-MCNC: 2.05 MG/DL (ref 0.6–1.3)
CREAT UR-MCNC: 268 MG/DL (ref 30–125)
ERYTHROCYTE [DISTWIDTH] IN BLOOD BY AUTOMATED COUNT: 13.5 % (ref 11.6–14.5)
GLUCOSE SERPL-MCNC: 145 MG/DL (ref 74–99)
HCT VFR BLD AUTO: 38.5 % (ref 36–48)
HGB BLD-MCNC: 11.9 G/DL (ref 13–16)
MCH RBC QN AUTO: 29 PG (ref 24–34)
MCHC RBC AUTO-ENTMCNC: 30.9 G/DL (ref 31–37)
MCV RBC AUTO: 93.7 FL (ref 74–97)
PHOSPHATE SERPL-MCNC: 3.9 MG/DL (ref 2.5–4.9)
PLATELET # BLD AUTO: 348 K/UL (ref 135–420)
PMV BLD AUTO: 11.4 FL (ref 9.2–11.8)
POTASSIUM SERPL-SCNC: 5.5 MMOL/L (ref 3.5–5.5)
PROT UR-MCNC: 47 MG/DL
PTH-INTACT SERPL-MCNC: 146.4 PG/ML (ref 18.4–88)
RBC # BLD AUTO: 4.11 M/UL (ref 4.7–5.5)
SODIUM SERPL-SCNC: 138 MMOL/L (ref 136–145)
WBC # BLD AUTO: 6.3 K/UL (ref 4.6–13.2)

## 2019-10-17 PROCEDURE — 86038 ANTINUCLEAR ANTIBODIES: CPT

## 2019-10-17 PROCEDURE — 82570 ASSAY OF URINE CREATININE: CPT

## 2019-10-17 PROCEDURE — 86335 IMMUNFIX E-PHORSIS/URINE/CSF: CPT

## 2019-10-17 PROCEDURE — 80069 RENAL FUNCTION PANEL: CPT

## 2019-10-17 PROCEDURE — 86803 HEPATITIS C AB TEST: CPT

## 2019-10-17 PROCEDURE — 82784 ASSAY IGA/IGD/IGG/IGM EACH: CPT

## 2019-10-17 PROCEDURE — 85027 COMPLETE CBC AUTOMATED: CPT

## 2019-10-17 PROCEDURE — 84156 ASSAY OF PROTEIN URINE: CPT

## 2019-10-17 PROCEDURE — 83970 ASSAY OF PARATHORMONE: CPT

## 2019-10-17 PROCEDURE — 86160 COMPLEMENT ANTIGEN: CPT

## 2019-10-17 PROCEDURE — 36415 COLL VENOUS BLD VENIPUNCTURE: CPT

## 2019-10-17 PROCEDURE — 82306 VITAMIN D 25 HYDROXY: CPT

## 2019-10-18 LAB
ANA TITR SER IF: NEGATIVE {TITER}
C3 SERPL-MCNC: 139 MG/DL (ref 82–167)
C4 SERPL-MCNC: 59 MG/DL (ref 14–44)
HCV AB SER IA-ACNC: 0.07 INDEX
HCV AB SERPL QL IA: NEGATIVE
HCV COMMENT,HCGAC: NORMAL

## 2019-10-21 LAB — INTERPRETATION UR IFE-IMP: NORMAL

## 2019-10-22 LAB
IGA SERPL-MCNC: 1183 MG/DL (ref 61–437)
IGG SERPL-MCNC: 975 MG/DL (ref 700–1600)
IGM SERPL-MCNC: 38 MG/DL (ref 15–143)
PROT PATTERN SERPL IFE-IMP: ABNORMAL

## 2019-11-13 ENCOUNTER — HOSPITAL ENCOUNTER (EMERGENCY)
Age: 74
Discharge: HOME | End: 2019-11-13
Attending: EMERGENCY MEDICINE
Payer: MEDICARE

## 2019-11-13 VITALS
HEIGHT: 71 IN | SYSTOLIC BLOOD PRESSURE: 167 MMHG | OXYGEN SATURATION: 99 % | DIASTOLIC BLOOD PRESSURE: 69 MMHG | HEART RATE: 59 BPM | TEMPERATURE: 97.9 F | RESPIRATION RATE: 14 BRPM | BODY MASS INDEX: 23.1 KG/M2 | WEIGHT: 165 LBS

## 2019-11-13 DIAGNOSIS — H00.011 HORDEOLUM EXTERNUM OF RIGHT UPPER EYELID: Primary | ICD-10-CM

## 2019-11-13 PROCEDURE — 99283 EMERGENCY DEPT VISIT LOW MDM: CPT

## 2019-11-13 PROCEDURE — 74011000250 HC RX REV CODE- 250: Performed by: PHYSICIAN ASSISTANT

## 2019-11-13 RX ORDER — PROPARACAINE HYDROCHLORIDE 5 MG/ML
1 SOLUTION/ DROPS OPHTHALMIC
Status: COMPLETED | OUTPATIENT
Start: 2019-11-13 | End: 2019-11-13

## 2019-11-13 RX ORDER — ERYTHROMYCIN 5 MG/G
OINTMENT OPHTHALMIC
Qty: 1 TUBE | Refills: 0 | Status: SHIPPED | OUTPATIENT
Start: 2019-11-13 | End: 2019-11-20

## 2019-11-13 RX ADMIN — PROPARACAINE HYDROCHLORIDE 1 DROP: 5 SOLUTION/ DROPS OPHTHALMIC at 15:11

## 2019-11-13 RX ADMIN — FLUORESCEIN SODIUM 1 STRIP: 1 STRIP OPHTHALMIC at 15:11

## 2019-11-13 NOTE — ED PROVIDER NOTES
EMERGENCY DEPARTMENT HISTORY AND PHYSICAL EXAM    Date: 11/13/2019  Patient Name: Anh Calvert    History of Presenting Illness     Chief Complaint   Patient presents with    Eye Pain    Nasal Pain         History Provided By: patient     Chief Complaint: eye pain and nasal pain  Duration: 2 days  Timing: acute  Location: L eye, L side of the nose   Quality: aching   Severity: moderate  Modifying Factors: none  Associated Symptoms: eyelid swelling, nasal pain, eye burning       Additional History (Context): Anh Calvert is a 69 y.o. male with PMH, hypertension, diabetes mellitus, cholesterolemia, COPD, GERD, and prostate cancer who presents with c/o swelling to the L upper eyelid, L eye burning, and pain into the L nostril x 2 days. Pt denies vision changes. Denies tx PTA. Pt does not wear contact lenses. No other complaints reported. PCP: Rosemary Osgood, MD    Current Facility-Administered Medications   Medication Dose Route Frequency Provider Last Rate Last Dose    fluorescein (FUL-CAITLYN) 1 mg ophthalmic strip 1 Strip  1 Strip Both Eyes NOW Zoe Mathews PA-C        proparacaine (OPTHAINE) 0.5 % ophthalmic solution 1 Drop  1 Drop Both Eyes NOW Zoe Mathews PA-C         Current Outpatient Medications   Medication Sig Dispense Refill    erythromycin (ILOTYCIN) ophthalmic ointment Apply to inner tear duct of the affected eye up to 6 times daily for 7 days 1 Tube 0    fluticasone propion-salmeterol (ADVAIR/WIXELA) 250-50 mcg/dose diskus inhaler       albuterol-ipratropium (DUO-NEB) 2.5 mg-0.5 mg/3 ml nebu       methylPREDNISolone (MEDROL DOSEPACK) 4 mg tablet       simvastatin (ZOCOR) 20 mg tablet Take 20 mg by mouth.  SYMBICORT 160-4.5 mcg/actuation HFAA daily.  FLOVENT  mcg/actuation inhaler       glimepiride (AMARYL) 4 mg tablet 4 mg two (2) times a day.  hydroCHLOROthiazide (HYDRODIURIL) 25 mg tablet 25 mg daily.       levothyroxine (SYNTHROID) 50 mcg tablet Daily (before breakfast).  pantoprazole (PROTONIX) 40 mg tablet 40 mg daily.  albuterol (PROVENTIL HFA) 90 mcg/actuation inhaler Take 1 Puff by inhalation every four (4) hours as needed for Wheezing. 1 Inhaler 1    lisinopril (PRINIVIL) 20 mg tablet Take  by mouth daily.  glipiZIDE (GLUCOTROL) 10 mg tablet Take 10 mg by mouth two (2) times a day. Past History     Past Medical History:  Past Medical History:   Diagnosis Date    Arthritis     Asthma     [facility] [facility]     Prostate     Chronic obstructive pulmonary disease ([facility] [facility]     Diabetes mellitus ([facility] [facility]     Elevated PSA     GERD (gastroesophageal reflux disease)     Hypercholesteremia     Hypertension     Nodular prostate without urinary obstruction     Thyroid disease        Past Surgical History:  Past Surgical History:   Procedure Laterality Date    COLONOSCOPY  8/22/2019         COLONOSCOPY N/A 8/22/2019    COLON[facility]el Briggs [facility] ENDOSCOPY    HX COLONOSCOPY      HX RADICAL PROSTATECTOMY  09/04/2018    [id number], GS 4+3    HX UROLOGICAL N/A 06/15/2018    PNBx-TRUS Vol. 35.6cc's, Sapello 8(4+4) R mid/R base/R lateral mid/R lateral base, Sap[place] 6(3+3) L latera base -- Dr Modesto Garcia       Family History:  Family History   Family history unknown: Yes       Social History:  Social History     Tobacco Use    Smoking status: Former Smoker     Packs/day: 0.50     Years: 40.00     Pack years: 20.00     Types: Cigarettes    Smokeless tobacco: Former User     Quit date: 7/7/2009    Tobacco comment: Prior to quiting 1 pack per day   Substance Use Topics    Alcohol use: Yes     Comment: occasionally    Drug use: Yes     Types: Marijuana     Comment: last used 08/28/2018       Allergies:  No Known Allergies      Review of Systems   Review of Systems   Constitutional: Negative. Negative for chills and fever. HENT: Negative for congestion, ear pain and rhinorrhea.          Nasal pain   Eyes: Positive for pain. Negative for redness. Eyelid swelling   Respiratory: Negative. Negative for cough, shortness of breath, wheezing and stridor. Cardiovascular: Negative. Negative for chest pain and leg swelling. Gastrointestinal: Negative. Negative for abdominal pain, constipation, diarrhea, nausea and vomiting. Genitourinary: Negative. Negative for dysuria and frequency. Musculoskeletal: Negative. Negative for back pain and neck pain. Skin: Negative. Negative for rash and wound. Neurological: Negative. Negative for dizziness, seizures, syncope and headaches. All other systems reviewed and are negative. All Other Systems Negative  Physical Exam     Vitals:    11/13/19 1448   BP: 167/69   Pulse: (!) 59   Resp: 14   Temp: 97.9 °F (36.6 °C)   SpO2: 99%   Weight: 74.8 kg (165 lb)   Height: 5' 11\" (1.803 m)     Physical Exam   Constitutional: He is oriented to person, place, and time. He appears well-developed and well-nourished. No distress. HENT:   Head: Normocephalic and atraumatic. Nose: Nose normal.   Mouth/Throat: Oropharynx is clear and moist.   Eyes: Pupils are equal, round, and reactive to light. Conjunctivae and EOM are normal. Right eye exhibits no discharge. Left eye exhibits no discharge. No scleral icterus. L upper lid hordeolum noted. Wood's lamp exam shows no corneal abrasion or dye uptake. No dendritic lesions seen. Jenni's sign negative. Neck: Normal range of motion. Neck supple. Cardiovascular: Normal rate, regular rhythm and normal heart sounds. Exam reveals no gallop and no friction rub. No murmur heard. Pulmonary/Chest: Effort normal and breath sounds normal. No stridor. No respiratory distress. He has no wheezes. He has no rales. Musculoskeletal: Normal range of motion. Neurological: He is alert and oriented to person, place, and time. Coordination normal.   Gait is steady and patient exhibits no evidence of ataxia.  Patient is able to ambulate without difficulty. No focal neurological deficit noted. No facial droop, slurred speech, or evidence of altered mentation noted on exam.     Skin: Skin is warm and dry. No rash noted. He is not diaphoretic. No erythema. Psychiatric: He has a normal mood and affect. His behavior is normal. Thought content normal.   Nursing note and vitals reviewed. Diagnostic Study Results     Labs -   No results found for this or any previous visit (from the past 12 hour(s)). Radiologic Studies -   No orders to display     CT Results  (Last 48 hours)    None        CXR Results  (Last 48 hours)    None            Medical Decision Making   I am the first provider for this patient. I reviewed the vital signs, available nursing notes, past medical history, past surgical history, family history and social history. Vital Signs-Reviewed the patient's vital signs. Records Reviewed: Zoe Mathews PA-C     Procedures:  Procedures    Provider Notes (Medical Decision Making): Impression:  Hordeolum    Wood's lamp exam negative for corneal abrasion. Clinical presentation suggestive of an upper lid hordeolum causing referred pain into the L nostril region. Will plan to d/c with erythromycin eye ointment and recommendation for warm compress several times daily. pcp follow-up in 1 week. Pt agrees with this plan.  Zoe Mathews PA-C 3:51 PM     MED RECONCILIATION:  Current Facility-Administered Medications   Medication Dose Route Frequency    fluorescein (FUL-CAITLYN) 1 mg ophthalmic strip 1 Strip  1 Strip Both Eyes NOW    proparacaine (OPTHAINE) 0.5 % ophthalmic solution 1 Drop  1 Drop Both Eyes NOW     Current Outpatient Medications   Medication Sig    erythromycin (ILOTYCIN) ophthalmic ointment Apply to inner tear duct of the affected eye up to 6 times daily for 7 days    fluticasone propion-salmeterol (ADVAIR/WIXELA) 250-50 mcg/dose diskus inhaler     albuterol-ipratropium (DUO-NEB) 2.5 mg-0.5 mg/3 ml nebu  methylPREDNISolone (MEDROL DOSEPACK) 4 mg tablet     simvastatin (ZOCOR) 20 mg tablet Take 20 mg by mouth.  SYMBICORT 160-4.5 mcg/actuation HFAA daily.  FLOVENT  mcg/actuation inhaler     glimepiride (AMARYL) 4 mg tablet 4 mg two (2) times a day.  hydroCHLOROthiazide (HYDRODIURIL) 25 mg tablet 25 mg daily.  levothyroxine (SYNTHROID) 50 mcg tablet Daily (before breakfast).  pantoprazole (PROTONIX) 40 mg tablet 40 mg daily.  albuterol (PROVENTIL HFA) 90 mcg/actuation inhaler Take 1 Puff by inhalation every four (4) hours as needed for Wheezing.  lisinopril (PRINIVIL) 20 mg tablet Take  by mouth daily.  glipiZIDE (GLUCOTROL) 10 mg tablet Take 10 mg by mouth two (2) times a day. Disposition:  D/c    DISCHARGE NOTE:   Patient is stable for discharge at this time. I have discussed all the findings from today's work up with the patient, including lab results and imaging. I have answered all questions. Rx for erythromycin eye ointment given. Rest and close follow-up with the PCP recommended this week. Return to the ED immediately for any new or worsening symptoms. Zoe Corrales PA-C 3:51 PM     Follow-up Information     Follow up With Specialties Details Why Contact Info    Sohail Lackey MD [facility] Schedule an appointment as soon as possible for a visit in 1 week  [address]  [address] EMERGENCY DEPT Emergency Medicine  As needed, If symptoms worsen [address] [phone]          Current Discharge Medication List      START taking these medications    Details   erythromycin (ILOTYCIN) ophthalmic ointment Apply to inner tear duct of the affected eye up to 6 times daily for 7 days  Qty: 1 Tube, Refills: 0                 Diagnosis     Clinical Impression:   1.  Hordeolum externum of right upper eyelid

## 2019-11-13 NOTE — ED TRIAGE NOTES
Patient reports left eye swelling X2 days and nasal pain.  Moderate swelling to left eye with sty present

## 2019-11-13 NOTE — DISCHARGE INSTRUCTIONS
Patient Education        Styes and Chalazia: Care Instructions  Your Care Instructions    Styes and chalazia (say \"llf-AMS-ejl-uh\") are both conditions that can cause swelling of the eyelid. A stye is an infection in the root of an eyelash. The infection causes a tender red lump on the edge of the eyelid. The infection can spread until the whole eyelid becomes red and inflamed. Styes usually break open, and a tiny amount of pus drains. They usually clear up on their own in about a week, but they sometimes need treatment with antibiotics. A chalazion is a lump or cyst in the eyelid (chalazion is singular; chalazia is plural). It is caused by swelling and inflammation of deep oil glands inside the eyelid. Chalazia are usually not infected. They can take a few months to heal.  If a chalazion becomes more swollen and painful or does not go away, you may need to have it drained by your doctor. Follow-up care is a key part of your treatment and safety. Be sure to make and go to all appointments, and call your doctor if you are having problems. It's also a good idea to know your test results and keep a list of the medicines you take. How can you care for yourself at home? · Do not rub your eyes. Do not squeeze or try to open a stye or chalazion. · To help a stye or chalazion heal faster:  ? Put a warm, moist compress on your eye for 5 to 10 minutes, 3 to 6 times a day. Heat often brings a stye to a point where it drains on its own. Keep in mind that warm compresses will often increase swelling a little at first.  ? Do not use hot water or heat a wet cloth in a microwave oven. The compress may get too hot and can burn the eyelid. · Always wash your hands before and after you use a compress or touch your eyes. · If the doctor gave you antibiotic drops or ointment, use the medicine exactly as directed. Use the medicine for as long as instructed, even if your eye starts to feel better.   · To put in eyedrops or ointment:  ? Tilt your head back, and pull your lower eyelid down with one finger. ? Drop or squirt the medicine inside the lower lid. ? Close your eye for 30 to 60 seconds to let the drops or ointment move around. ? Do not touch the ointment or dropper tip to your eyelashes or any other surface. · Do not wear eye makeup or contact lenses until the stye or chalazion heals. · Do not share towels, pillows, or washcloths while you have a stye. When should you call for help? Call your doctor now or seek immediate medical care if:    · You have pain in your eye.     · You have a change in vision or loss of vision.     · Redness and swelling get much worse.    Watch closely for changes in your health, and be sure to contact your doctor if:    · Your stye does not get better in 1 week.     · Your chalazion does not start to get better after several weeks. Where can you learn more? Go to http://lula-antonia.info/. Enter H422 in the search box to learn more about \"Styes and Chalazia: Care Instructions. \"  Current as of: May 5, 2019  Content Version: 12.2  © 6322-9511 [employer]. Care instructions adapted under license by [employer] (which disclaims liability or warranty for this information). If you have questions about a medical condition or this instruction, always ask your healthcare professional. [place] [address] any warranty or liability for your use of this information. [employer] Activation    Thank you for requesting access to [employer]. Please follow the instructions below to securely access and download your online medical record. [employer] allows you to send messages to your doctor, view your test results, renew your prescriptions, schedule appointments, and more. How Do I Sign Up? 1. In your internet browser, go to [email or web address]  2. Click on the First Time User? Click Here link in the Sign In box.  You will be redirect to the New Member Sign Up page. 3. Enter your [employer] Access Code exactly as it appears below. You will not need to use this code after youve completed the sign-up process. If you do not sign up before the expiration date, you must request a new code. [employer] Access Code: [email or web address]  Expires: 2019  9:49 AM (This is the date your [employer] access code will )    4. Enter the last four digits of your Social Security Number (xxxx) and Date of Birth (mm/dd/yyyy) as indicated and click Submit. You will be taken to the next sign-up page. 5. Create a [employer] ID. This will be your [employer] login ID and cannot be changed, so think of one that is secure and easy to remember. 6. Create a [employer] password. You can change your password at any time. 7. Enter your Password Reset Question and Answer. This can be used at a later time if you forget your password. 8. Enter your e-mail address. You will receive e-mail notification when new information is available in [address] 9. Click Sign Up. You can now view and download portions of your medical record. 10. Click the Download Summary menu link to download a portable copy of your medical information. Additional Information    If you have questions, please visit the Frequently Asked Questions section of the [employer] website at [email or web address] Remember, [employer] is NOT to be used for urgent needs. For medical emergencies, dial 911. Complete all medications as prescribed. Follow-up with primary care doctor in 1 week. Return to the ED immediately for any new or worsening symptoms.

## 2019-12-23 ENCOUNTER — HOSPITAL ENCOUNTER (EMERGENCY)
Age: 74
Discharge: HOME | End: 2019-12-23
Attending: EMERGENCY MEDICINE
Payer: MEDICARE

## 2019-12-23 ENCOUNTER — APPOINTMENT (OUTPATIENT)
Dept: GENERAL RADIOLOGY | Age: 74
End: 2019-12-23
Attending: PHYSICIAN ASSISTANT
Payer: MEDICARE

## 2019-12-23 VITALS
SYSTOLIC BLOOD PRESSURE: 153 MMHG | TEMPERATURE: 97.5 F | HEART RATE: 61 BPM | WEIGHT: 153 LBS | BODY MASS INDEX: 21.42 KG/M2 | HEIGHT: 71 IN | DIASTOLIC BLOOD PRESSURE: 107 MMHG | OXYGEN SATURATION: 100 % | RESPIRATION RATE: 20 BRPM

## 2019-12-23 DIAGNOSIS — M25.531 WRIST PAIN, ACUTE, RIGHT: Primary | ICD-10-CM

## 2019-12-23 PROCEDURE — 99281 EMR DPT VST MAYX REQ PHY/QHP: CPT

## 2019-12-23 PROCEDURE — 73110 X-RAY EXAM OF WRIST: CPT

## 2019-12-23 RX ORDER — IBUPROFEN 600 MG/1
600 TABLET ORAL
Qty: 20 TAB | Refills: 0 | Status: SHIPPED | OUTPATIENT
Start: 2019-12-23

## 2019-12-23 RX ORDER — TRAMADOL HYDROCHLORIDE 50 MG/1
50 TABLET ORAL
Qty: 9 TAB | Refills: 0 | Status: SHIPPED | OUTPATIENT
Start: 2019-12-23 | End: 2019-12-26

## 2019-12-23 NOTE — LETTER
NOTIFICATION RETURN TO WORK / SCHOOL 
 
12/23/2019 8:12 PM 
 
Mr. Anh Calvert [address] [facility] [address] [place] [address] [phone] To Whom It May Concern: 
 
Anh Calvert is currently under the care of [facility] EMERGENCY DEPT. He will return to work/school on: 12/27/19 If there are questions or concerns please have the patient contact our office. Sincerely, Fe Swartz PA-C

## 2019-12-23 NOTE — LETTER
NOTIFICATION RETURN TO WORK / SCHOOL 
 
12/23/2019 8:16 PM 
 
Mr. Anabelle Draper [address] [facility] [address] [place] [phone] To Whom It May Concern: 
 
Anabelle Draper is currently under the care of [facility] EMERGENCY DEPT. He will return to work/school on: 12/28/19 If there are questions or concerns please have the patient contact our office. Sincerely, Gabbi Cardona PA-C

## 2019-12-24 NOTE — ED PROVIDER NOTES
EMERGENCY DEPARTMENT HISTORY AND PHYSICAL EXAM    Date: 12/23/2019  Patient Name: David Hawk    History of Presenting Illness     Chief Complaint   Patient presents with    Wrist Pain         History Provided By: patient      Additional History (Context): David Hawk is a 69-year-old male presenting to the emergency department with right wrist pain. States pain started few days ago, does a lot of heavy lifting with work. States he thinks he may have overdone it obtain boxes. Is supposed to be at work today, tomorrow the next day. No direct trauma. No numbness or tingling. No fevers, chills, IV drug use or history of septic arthritis. No other complaints at this time. Does have a history of gout. PCP: Angle Underwood MD    Current Outpatient Medications   Medication Sig Dispense Refill    traMADol (ULTRAM) 50 mg tablet Take 1 Tab by mouth every eight (8) hours as needed for Pain for up to 3 days. Max Daily Amount: 150 mg. 9 Tab 0    ibuprofen (MOTRIN) 600 mg tablet Take 1 Tab by mouth every six (6) hours as needed for Pain. 20 Tab 0    fluticasone propion-salmeterol (ADVAIR/WIXELA) 250-50 mcg/dose diskus inhaler       albuterol-ipratropium (DUO-NEB) 2.5 mg-0.5 mg/3 ml nebu       methylPREDNISolone (MEDROL DOSEPACK) 4 mg tablet       simvastatin (ZOCOR) 20 mg tablet Take 20 mg by mouth.  SYMBICORT 160-4.5 mcg/actuation HFAA daily.  FLOVENT  mcg/actuation inhaler       glimepiride (AMARYL) 4 mg tablet 4 mg two (2) times a day.  hydroCHLOROthiazide (HYDRODIURIL) 25 mg tablet 25 mg daily.  levothyroxine (SYNTHROID) 50 mcg tablet Daily (before breakfast).  pantoprazole (PROTONIX) 40 mg tablet 40 mg daily.  albuterol (PROVENTIL HFA) 90 mcg/actuation inhaler Take 1 Puff by inhalation every four (4) hours as needed for Wheezing. 1 Inhaler 1    lisinopril (PRINIVIL) 20 mg tablet Take  by mouth daily.         glipiZIDE (GLUCOTROL) 10 mg tablet Take 10 mg by mouth two (2) times a day. Past History     Past Medical History:  Past Medical History:   Diagnosis Date    Arthritis     Asthma     Cancer ([facility] [address]     Prostate     Chronic obstructive pulmonary disease ([facility] [address]     Diabetes mellitus ([facility] [address]     Elevated PSA     GERD (gastroesophageal reflux disease)     Hypercholesteremia     Hypertension     Nodular prostate without urinary obstruction     Thyroid disease        Past Surgical History:  Past Surgical History:   Procedure Laterality Date    COLONOSCOPY  8/22/2019         COLONOSCOPY N/A 8/22/2019    COLONOSCOPY performed by Shravan Chu [facility] at [facility] ENDOSCOPY    HX COLONOSCOPY      HX RADICAL PROSTATECTOMY  09/04/2018    [id number], GS 4+3    HX UROLOGICAL N/A 06/15/2018    PNBx-TRUS Vol. 35.6cc's, [place] 8(4+4) R mid/R base/R lateral mid/R lateral base, [place] 6(3+3) L latera base -- Dr Geeta Yo       Family History:  Family History   Family history unknown: Yes       Social History:  Social History     Tobacco Use    Smoking status: Former Smoker     Packs/day: 0.50     Years: 40.00     Pack years: 20.00     Types: Cigarettes    Smokeless tobacco: Former User     Quit date: 7/7/2009    Tobacco comment: Prior to quiting 1 pack per day   Substance Use Topics    Alcohol use: Yes     Comment: occasionally    Drug use: Yes     Types: Marijuana     Comment: last used 08/28/2018       Allergies:  No Known Allergies      Review of Systems       Review of Systems   Constitutional: Negative for chills and fever. HENT: Negative for nasal congestion, sore throat, rhinorrhea  Eyes: Negative. Respiratory: pos cough and negative for shortness of breath. Cardiovascular: Negative for chest pain and palpitations. Gastrointestinal: Negative for abdominal pain, constipation, diarrhea, nausea and vomiting. Genitourinary: Negative. Negative for difficulty urinating and flank pain. Musculoskeletal: Negative for back pain.  Negative for gait problem and neck pain. Positive for wrist pain  Skin: Negative. Allergic/Immunologic: Negative. Neurological: Negative for dizziness, weakness, numbness and headaches. Psychiatric/Behavioral: Negative. All other systems reviewed and are negative. All Other Systems Negative  Physical Exam     Vitals:    12/23/19 1906   BP: (!) 153/107   Pulse: 61   Resp: 20   Temp: 97.5 °F (36.4 °C)   SpO2: 100%   Weight: 69.4 kg (153 lb)   Height: 5' 11\" (1.803 m)     Physical Exam  Vitals signs and nursing note reviewed. Constitutional:       General: He is not in acute distress. Appearance: He is well-developed. He is not diaphoretic. HENT:      Head: Normocephalic and atraumatic. Nose: Nose normal.   Eyes:      Conjunctiva/sclera: Conjunctivae normal.      Pupils: Pupils are equal, round, and reactive to light. Neck:      Musculoskeletal: Normal range of motion and neck supple. Cardiovascular:      Rate and Rhythm: Normal rate and regular rhythm. Pulmonary:      Effort: Pulmonary effort is normal. No respiratory distress. Breath sounds: Normal breath sounds. Abdominal:      Palpations: Abdomen is soft. Musculoskeletal: Normal range of motion. Right wrist: He exhibits tenderness and bony tenderness. He exhibits normal range of motion, no swelling, no effusion, no crepitus, no deformity and no laceration. Skin:     General: Skin is warm. Findings: No rash. Neurological:      Mental Status: He is alert and oriented to person, place, and time. Cranial Nerves: No cranial nerve deficit. Coordination: Coordination normal.   Psychiatric:         Behavior: Behavior normal.           Diagnostic Study Results     Labs -   No results found for this or any previous visit (from the past 12 hour(s)). Radiologic Studies -   XR WRIST RT AP/LAT/OBL MIN 3V   Final Result   IMPRESSION:       No acute osseus abnormality identified.         CT Results  (Last 48 hours)    None CXR Results  (Last 48 hours)    None            Medical Decision Making   I am the first provider for this patient. I reviewed the vital signs, available nursing notes, past medical history, past surgical history, family history and social history. Vital Signs-Reviewed the patient's vital signs. Records Reviewed: Nursing notes, old medical records and any previous labs, imaging, visits, consultations pertinent to patient care    Procedures:  Procedures    ED Course: Progress Notes, Reevaluation, and Consults:      Provider Notes (Medical Decision Making):     Xray negative for acute process. Appropriate for out pt management. Will discuss supportive treatment, NSAIDS, RICE and ortho f/u. Discussed proper way to take medications. Discussed treatment plan, return precautions, symptomatic relief, and expected time to improvement. All questions answered. Patient is stable for discharge and outpatient management. MED RECONCILIATION:  No current facility-administered medications for this encounter. Current Outpatient Medications   Medication Sig    traMADol (ULTRAM) 50 mg tablet Take 1 Tab by mouth every eight (8) hours as needed for Pain for up to 3 days. Max Daily Amount: 150 mg.    ibuprofen (MOTRIN) 600 mg tablet Take 1 Tab by mouth every six (6) hours as needed for Pain.  fluticasone propion-salmeterol (ADVAIR/WIXELA) 250-50 mcg/dose diskus inhaler     albuterol-ipratropium (DUO-NEB) 2.5 mg-0.5 mg/3 ml nebu     methylPREDNISolone (MEDROL DOSEPACK) 4 mg tablet     simvastatin (ZOCOR) 20 mg tablet Take 20 mg by mouth.  SYMBICORT 160-4.5 mcg/actuation HFAA daily.  FLOVENT  mcg/actuation inhaler     glimepiride (AMARYL) 4 mg tablet 4 mg two (2) times a day.  hydroCHLOROthiazide (HYDRODIURIL) 25 mg tablet 25 mg daily.  levothyroxine (SYNTHROID) 50 mcg tablet Daily (before breakfast).  pantoprazole (PROTONIX) 40 mg tablet 40 mg daily.     albuterol (PROVENTIL HFA) 90 mcg/actuation inhaler Take 1 Puff by inhalation every four (4) hours as needed for Wheezing.  lisinopril (PRINIVIL) 20 mg tablet Take  by mouth daily.  glipiZIDE (GLUCOTROL) 10 mg tablet Take 10 mg by mouth two (2) times a day. Disposition:  home    DISCHARGE NOTE:     Pt has been reexamined. Patient has no new complaints, changes, or physical findings. Care plan outlined and precautions discussed. Discussed proper way to take medications. Discussed treatment plan, return precautions, symptomatic relief, and expected time to improvement. All questions answered. Patient is stable for discharge and outpatient management. Patient is ready to go home. Follow-up Information     Follow up With Specialties Details Why Contact Info    [facility] EMERGENCY DEPT Emergency Medicine   [address]    Cathy Paiz MD [facility]   [address]  [address] [place] [address]  [phone]            Discharge Medication List as of 12/23/2019  8:17 PM      START taking these medications    Details   traMADol (ULTRAM) 50 mg tablet Take 1 Tab by mouth every eight (8) hours as needed for Pain for up to 3 days. Max Daily Amount: 150 mg., Print, Disp-9 Tab, R-0      ibuprofen (MOTRIN) 600 mg tablet Take 1 Tab by mouth every six (6) hours as needed for Pain., Print, Disp-20 Tab, R-0         CONTINUE these medications which have NOT CHANGED    Details   fluticasone propion-salmeterol (ADVAIR/WIXELA) 250-50 mcg/dose diskus inhaler Historical Med      albuterol-ipratropium (DUO-NEB) 2.5 mg-0.5 mg/3 ml nebu Historical Med      methylPREDNISolone (MEDROL DOSEPACK) 4 mg tablet Historical Med      simvastatin (ZOCOR) 20 mg tablet Take 20 mg by mouth., Historical Med      SYMBICORT 160-4.5 mcg/actuation HFAA daily. , Historical Med, BLAYNE      FLOVENT  mcg/actuation inhaler Historical Med, [place]LAYNE      glimepiride (AMARYL) 4 mg tablet 4 mg two (2) times a day. , Historical Med      hydroCHLOROthiazide (HYDRODIURIL) 25 mg tablet 25 mg daily. , Historical Med      levothyroxine (SYNTHROID) 50 mcg tablet Daily (before breakfast). , Historical Med      pantoprazole (PROTONIX) 40 mg tablet 40 mg daily. , Historical Med      albuterol (PROVENTIL HFA) 90 mcg/actuation inhaler Take 1 Puff by inhalation every four (4) hours as needed for Wheezing., Print, Disp-1 Inhaler, R-1      lisinopril (PRINIVIL) 20 mg tablet Take  by mouth daily. , Historical Med      glipiZIDE (GLUCOTROL) 10 mg tablet Take 10 mg by mouth two (2) times a day.  , Historical Med                   Diagnosis     Clinical Impression:   1. Wrist pain, acute, right          Dictation disclaimer:  Please note that this dictation was completed with [employer], the [employer] voice recognition software. Quite often unanticipated grammatical, syntax, homophones, and other interpretive errors are inadvertently transcribed by the computer software. Please disregard these errors. Please excuse any errors that have escaped final proofreading.

## 2019-12-24 NOTE — DISCHARGE INSTRUCTIONS
Joint Pain: Care Instructions  Your Care Instructions    Many people have small aches and pains from overuse or injury to muscles and joints. Joint injuries often happen during sports or recreation, work tasks, or projects around the home. An overuse injury can happen when you put too much stress on a joint or when you do an activity that stresses the joint over and over, such as using the computer or rowing a boat. You can take action at home to help your muscles and joints get better. You should feel better in 1 to 2 weeks, but it can take 3 months or more to heal completely. Follow-up care is a key part of your treatment and safety. Be sure to make and go to all appointments, and call your doctor if you are having problems. It's also a good idea to know your test results and keep a list of the medicines you take. How can you care for yourself at home? · Do not put weight on the injured joint for at least a day or two. · For the first day or two after an injury, do not take hot showers or baths, and do not use hot packs. The heat could make swelling worse. · Put ice or a cold pack on the sore joint for 10 to 20 minutes at a time. Try to do this every 1 to 2 hours for the next 3 days (when you are awake) or until the swelling goes down. Put a thin cloth between the ice and your skin. · Wrap the injury in an elastic bandage. Do not wrap it too tightly because this can cause more swelling. · Prop up the sore joint on a pillow when you ice it or anytime you sit or lie down during the next 3 days. Try to keep it above the level of your heart. This will help reduce swelling. · Take an over-the-counter pain medicine, such as acetaminophen (Tylenol), ibuprofen (Advil, Motrin), or naproxen (Aleve). Read and follow all instructions on the label. · After 1 or 2 days of rest, begin moving the joint gently.  While the joint is still healing, you can begin to exercise using activities that do not strain or hurt the painful joint. When should you call for help? Call your doctor now or seek immediate medical care if:    · You have signs of infection, such as:  ? Increased pain, swelling, warmth, and redness. ? Red streaks leading from the joint. ? A fever.    Watch closely for changes in your health, and be sure to contact your doctor if:    · Your movement or symptoms are not getting better after 1 to 2 weeks of home treatment. Where can you learn more? Go to http://lula-antonia.info/. Enter P205 in the search box to learn more about \"Joint Pain: Care Instructions. \"  Current as of: June 26, 2019  Content Version: 12.2  © 3867-8298 [employer]. Care instructions adapted under license by [employer] (which disclaims liability or warranty for this information). If you have questions about a medical condition or this instruction, always ask your healthcare professional. [place] [address] any warranty or liability for your use of this information. Patient Education        Joint Pain: Care Instructions  Your Care Instructions    Many people have small aches and pains from overuse or injury to muscles and joints. Joint injuries often happen during sports or recreation, work tasks, or projects around the home. An overuse injury can happen when you put too much stress on a joint or when you do an activity that stresses the joint over and over, such as using the computer or rowing a boat. You can take action at home to help your muscles and joints get better. You should feel better in 1 to 2 weeks, but it can take 3 months or more to heal completely. Follow-up care is a key part of your treatment and safety. Be sure to make and go to all appointments, and call your doctor if you are having problems. It's also a good idea to know your test results and keep a list of the medicines you take. How can you care for yourself at home?   · Do not put weight on the injured joint for at least a day or two. · For the first day or two after an injury, do not take hot showers or baths, and do not use hot packs. The heat could make swelling worse. · Put ice or a cold pack on the sore joint for 10 to 20 minutes at a time. Try to do this every 1 to 2 hours for the next 3 days (when you are awake) or until the swelling goes down. Put a thin cloth between the ice and your skin. · Wrap the injury in an elastic bandage. Do not wrap it too tightly because this can cause more swelling. · Prop up the sore joint on a pillow when you ice it or anytime you sit or lie down during the next 3 days. Try to keep it above the level of your heart. This will help reduce swelling. · Take an over-the-counter pain medicine, such as acetaminophen (Tylenol), ibuprofen (Advil, Motrin), or naproxen (Aleve). Read and follow all instructions on the label. · After 1 or 2 days of rest, begin moving the joint gently. While the joint is still healing, you can begin to exercise using activities that do not strain or hurt the painful joint. When should you call for help? Call your doctor now or seek immediate medical care if:    · You have signs of infection, such as:  ? Increased pain, swelling, warmth, and redness. ? Red streaks leading from the joint. ? A fever.    Watch closely for changes in your health, and be sure to contact your doctor if:    · Your movement or symptoms are not getting better after 1 to 2 weeks of home treatment. Where can you learn more? Go to http://lula-antonia.info/. Enter P205 in the search box to learn more about \"Joint Pain: Care Instructions. \"  Current as of: June 26, 2019  Content Version: 12.2  © 6213-7736 [employer]. Care instructions adapted under license by [employer] (which disclaims liability or warranty for this information).  If you have questions about a medical condition or this instruction, always ask your healthcare professional. [address] any warranty or liability for your use of this information.

## 2021-08-03 PROBLEM — C61 MALIGNANT NEOPLASM OF PROSTATE (HCC): Status: RESOLVED | Noted: 2018-07-25 | Resolved: 2021-08-03

## 2022-03-18 PROBLEM — R19.5 HEME POSITIVE STOOL: Status: ACTIVE | Noted: 2019-08-22

## 2022-03-19 PROBLEM — C61 PROSTATE CA (HCC): Status: ACTIVE | Noted: 2018-08-31

## 2024-07-10 PROBLEM — R19.5 ABNORMAL FECES: Status: ACTIVE | Noted: 2019-08-22

## 2024-07-10 PROBLEM — I10 ESSENTIAL HYPERTENSION: Status: ACTIVE | Noted: 2024-07-10

## 2024-07-10 PROBLEM — K57.30 DIVERTICULOSIS OF COLON: Status: ACTIVE | Noted: 2024-07-10

## 2024-07-10 PROBLEM — E11.9 TYPE 2 DIABETES MELLITUS WITHOUT COMPLICATION (HCC): Status: ACTIVE | Noted: 2024-07-10

## 2024-07-10 PROBLEM — D12.5 BENIGN NEOPLASM OF SIGMOID COLON: Status: ACTIVE | Noted: 2024-07-10

## 2024-07-10 PROBLEM — Z12.11 SCREENING FOR MALIGNANT NEOPLASM OF COLON: Status: ACTIVE | Noted: 2024-07-10

## 2024-08-09 PROBLEM — Z12.11 SCREENING FOR MALIGNANT NEOPLASM OF COLON: Status: RESOLVED | Noted: 2024-07-10 | Resolved: 2024-08-09

## 2024-08-13 ENCOUNTER — HOSPITAL ENCOUNTER (OUTPATIENT)
Facility: HOSPITAL | Age: 79
Setting detail: SPECIMEN
Discharge: HOME | End: 2024-08-16

## (undated) DEVICE — INTENDED FOR TISSUE SEPARATION, AND OTHER PROCEDURES THAT REQUIRE A SHARP SURGICAL BLADE TO PUNCTURE OR CUT.: Brand: BARD-PARKER ® CARBON RIB-BACK BLADES

## (undated) DEVICE — DECANTER VI C-FLO LF --

## (undated) DEVICE — APPLIER CLP M/L SHFT DIA5MM 15 LIG LIGAMAX 5

## (undated) DEVICE — BASIN EMESIS 500CC ROSE 250/CS 60/PLT: Brand: MEDEGEN MEDICAL PRODUCTS, LLC

## (undated) DEVICE — SUTURE DEV SZ 3-0 V-LOC 90 L12IN TO L18IN CV-23 VLT VLOCM0844

## (undated) DEVICE — CLIP LIG ABSRB HEM-LOK LG PUR --

## (undated) DEVICE — TRAP SPEC COLL POLYP POLYSTYR --

## (undated) DEVICE — BAG DRAIN URIN 2000ML LF STRL -- CONVERT TO ITEM 363123

## (undated) DEVICE — CLIP INT XL YEL POLYMER HEM-O-LOK WECK

## (undated) DEVICE — CATH NEPHSTMY 4 WNG MCOT 24FR -- LTX

## (undated) DEVICE — SYR IRR CATH TIP LR ADPT 70ML -- CONVERT TO ITEM 363120

## (undated) DEVICE — SOLUTION IV STRL H2O 500 ML AQUALITE POUR BTL

## (undated) DEVICE — SOL INJ SOD CL 0.9% 1000ML BG --

## (undated) DEVICE — TIP COVER ACCESSORY

## (undated) DEVICE — REM POLYHESIVE ADULT PATIENT RETURN ELECTRODE: Brand: VALLEYLAB

## (undated) DEVICE — LIGHT HANDLE: Brand: DEVON

## (undated) DEVICE — DERMABOND SKIN ADH 0.7ML -- DERMABOND ADVANCED 12/BX

## (undated) DEVICE — FLOSEAL HEMOSTATIC MATRIX, 10 ML: Brand: FLOSEAL

## (undated) DEVICE — OBTRTR BLDELSS 8MM DISP -- DA VINCI - SNGL USE

## (undated) DEVICE — CONTAINER PREFIL FRMLN 15ML --

## (undated) DEVICE — Device

## (undated) DEVICE — DISPOSABLE SUCTION/IRRIGATOR TUBE SET WITH TIP: Brand: AHTO

## (undated) DEVICE — DRAPE,UTILITY,TAPE,15X26,STERILE: Brand: MEDLINE

## (undated) DEVICE — CARTRIDGE CLP LIG HEMLOK GRN --

## (undated) DEVICE — SPECIMEN RETRIEVAL POUCH: Brand: ENDO CATCH GOLD

## (undated) DEVICE — BLADELESS OPTICAL TROCAR WITH FIXATION CANNULA: Brand: VERSAPORT

## (undated) DEVICE — NDL PRT INJ NSAF BLNT 18GX1.5 --

## (undated) DEVICE — KENDALL SCD EXPRESS SLEEVES, KNEE LENGTH, MEDIUM: Brand: KENDALL SCD

## (undated) DEVICE — TUBING, SUCTION, 3/16" X 6', STRAIGHT: Brand: MEDLINE

## (undated) DEVICE — INSTRMT SET WND CLSR SUT PASS --

## (undated) DEVICE — SUTURE V-LOC 90 3-0 L6IN ABSRB UD CV-23 L17MM 1/2 CIR VLOCM1904

## (undated) DEVICE — SYRINGE MED 3ML NDL 20GA L1.5IN PLAS N CTRL LUERLOCK TIP

## (undated) DEVICE — VISUALIZATION SYSTEM: Brand: CLEARIFY

## (undated) DEVICE — STERILE POLYISOPRENE POWDER-FREE SURGICAL GLOVES: Brand: PROTEXIS

## (undated) DEVICE — SYRINGE BLB 50CC IRRIG PLIABLE FNGR FLNG GRAD FLSK DISP

## (undated) DEVICE — DRAPE KIT ACCS 4-ARM DISP -- DA VINCI

## (undated) DEVICE — BLADE ASSEMB CLP HAIR FINE --

## (undated) DEVICE — STAPLER SKIN H3.9MM WIRE DIA0.58MM CRWN 6.9MM 35 STPL FIX

## (undated) DEVICE — SHEET,DRAPE,70X100,STERILE: Brand: MEDLINE

## (undated) DEVICE — Z DISCONTINUED USE 2639304 STRAP POS W3INXL30FT WIDE BK TO BK HK AND LOOP CLSR ALISTRP

## (undated) DEVICE — SUTURE VCRL SZ 3-0 L27IN ABSRB UD L26MM SH 1/2 CIR J416H

## (undated) DEVICE — DEVICE SECUREMENT AD W/ TRICOT ANCHR PD FOR F LTX SIL CATH

## (undated) DEVICE — SNARE POLYP OVAL TIP 30X55MM -- LARIAT

## (undated) DEVICE — PAD,NON-ADHERENT,3X8,STERILE,LF,1/PK: Brand: MEDLINE

## (undated) DEVICE — SYR 50ML SLIP TIP NSAF LF STRL --

## (undated) DEVICE — 1010 S-DRAPE TOWEL DRAPE 10/BX: Brand: STERI-DRAPE™

## (undated) DEVICE — SYR 10ML CTRL LR LCK NSAF LF --

## (undated) DEVICE — ELECTRO LUBE IS A SINGLE PATIENT USE DEVICE THAT IS INTENDED TO BE USED ON ELECTROSURGICAL ELECTRODES TO REDUCE STICKING.: Brand: KEY SURGICAL ELECTRO LUBE

## (undated) DEVICE — (D)PREP SKN CHLRAPRP APPL 26ML -- CONVERT TO ITEM 371833

## (undated) DEVICE — FLEX ADVANTAGE 1500CC: Brand: FLEX ADVANTAGE

## (undated) DEVICE — CATHETER F BLLN 5CC 18FR 2 W HYDRGEL COAT LESS TRAUM LUB

## (undated) DEVICE — KENDALL 500 SERIES DIAPHORETIC FOAM MONITORING ELECTRODE - TEAR DROP SHAPE ( 30/PK): Brand: KENDALL

## (undated) DEVICE — BLADELESS OPTICAL TROCAR WITH FIXATION CANNULA: Brand: VERSAONE

## (undated) DEVICE — NEEDLE HYPO 25GA L1.5IN BLU POLYPR HUB S STL REG BVL STR

## (undated) DEVICE — LAPAROSCOPIC SCISSORS: Brand: EPIX LAPAROSCOPIC SCISSORS

## (undated) DEVICE — CATHETER URETH 20FR 5CC BLLN SIL ELASTMR F 2 W